# Patient Record
Sex: FEMALE | Race: WHITE | NOT HISPANIC OR LATINO | URBAN - METROPOLITAN AREA
[De-identification: names, ages, dates, MRNs, and addresses within clinical notes are randomized per-mention and may not be internally consistent; named-entity substitution may affect disease eponyms.]

---

## 2023-01-05 ENCOUNTER — LAB VISIT (OUTPATIENT)
Dept: LAB | Facility: HOSPITAL | Age: 58
End: 2023-01-05
Attending: INTERNAL MEDICINE
Payer: COMMERCIAL

## 2023-01-05 ENCOUNTER — OFFICE VISIT (OUTPATIENT)
Dept: RHEUMATOLOGY | Facility: CLINIC | Age: 58
End: 2023-01-05
Payer: COMMERCIAL

## 2023-01-05 VITALS
OXYGEN SATURATION: 97 % | BODY MASS INDEX: 23.93 KG/M2 | HEART RATE: 90 BPM | WEIGHT: 118.69 LBS | RESPIRATION RATE: 20 BRPM | SYSTOLIC BLOOD PRESSURE: 116 MMHG | DIASTOLIC BLOOD PRESSURE: 73 MMHG | HEIGHT: 59 IN

## 2023-01-05 DIAGNOSIS — D84.821 IMMUNODEFICIENCY DUE TO TREATMENT WITH IMMUNOSUPPRESSIVE MEDICATION: ICD-10-CM

## 2023-01-05 DIAGNOSIS — M06.9 RHEUMATOID ARTHRITIS, INVOLVING UNSPECIFIED SITE, UNSPECIFIED WHETHER RHEUMATOID FACTOR PRESENT: Primary | ICD-10-CM

## 2023-01-05 DIAGNOSIS — Z79.1 NSAID LONG-TERM USE: ICD-10-CM

## 2023-01-05 DIAGNOSIS — Z79.899 IMMUNODEFICIENCY DUE TO TREATMENT WITH IMMUNOSUPPRESSIVE MEDICATION: ICD-10-CM

## 2023-01-05 DIAGNOSIS — G62.9 PERIPHERAL POLYNEUROPATHY: ICD-10-CM

## 2023-01-05 DIAGNOSIS — M79.7 FIBROMYALGIA: ICD-10-CM

## 2023-01-05 DIAGNOSIS — M15.9 PRIMARY OSTEOARTHRITIS INVOLVING MULTIPLE JOINTS: ICD-10-CM

## 2023-01-05 DIAGNOSIS — Z71.89 COUNSELING AND COORDINATION OF CARE: ICD-10-CM

## 2023-01-05 DIAGNOSIS — M06.9 RHEUMATOID ARTHRITIS, INVOLVING UNSPECIFIED SITE, UNSPECIFIED WHETHER RHEUMATOID FACTOR PRESENT: ICD-10-CM

## 2023-01-05 PROCEDURE — 86140 C-REACTIVE PROTEIN: CPT | Performed by: INTERNAL MEDICINE

## 2023-01-05 PROCEDURE — 85025 COMPLETE CBC W/AUTO DIFF WBC: CPT | Performed by: INTERNAL MEDICINE

## 2023-01-05 PROCEDURE — 99204 PR OFFICE/OUTPT VISIT, NEW, LEVL IV, 45-59 MIN: ICD-10-PCS | Mod: S$GLB,,, | Performed by: INTERNAL MEDICINE

## 2023-01-05 PROCEDURE — 85652 RBC SED RATE AUTOMATED: CPT | Performed by: INTERNAL MEDICINE

## 2023-01-05 PROCEDURE — 99204 OFFICE O/P NEW MOD 45 MIN: CPT | Mod: S$GLB,,, | Performed by: INTERNAL MEDICINE

## 2023-01-05 PROCEDURE — 80053 COMPREHEN METABOLIC PANEL: CPT | Performed by: INTERNAL MEDICINE

## 2023-01-05 PROCEDURE — 99999 PR PBB SHADOW E&M-NEW PATIENT-LVL III: ICD-10-PCS | Mod: PBBFAC,,, | Performed by: INTERNAL MEDICINE

## 2023-01-05 PROCEDURE — 99999 PR PBB SHADOW E&M-NEW PATIENT-LVL III: CPT | Mod: PBBFAC,,, | Performed by: INTERNAL MEDICINE

## 2023-01-05 PROCEDURE — 36415 COLL VENOUS BLD VENIPUNCTURE: CPT | Mod: PO | Performed by: INTERNAL MEDICINE

## 2023-01-05 RX ORDER — LEFLUNOMIDE 20 MG/1
20 TABLET ORAL DAILY
Qty: 90 TABLET | Refills: 3 | Status: SHIPPED | OUTPATIENT
Start: 2023-01-05 | End: 2023-09-05 | Stop reason: SDUPTHER

## 2023-01-05 RX ORDER — LEFLUNOMIDE 20 MG/1
1 TABLET ORAL
COMMUNITY
Start: 2022-03-05 | End: 2023-01-05 | Stop reason: SDUPTHER

## 2023-01-05 RX ORDER — TRAMADOL HYDROCHLORIDE 50 MG/1
TABLET ORAL
COMMUNITY
End: 2023-01-05 | Stop reason: SDUPTHER

## 2023-01-05 RX ORDER — AZELASTINE HYDROCHLORIDE, FLUTICASONE PROPIONATE 137; 50 UG/1; UG/1
SPRAY, METERED NASAL
COMMUNITY

## 2023-01-05 RX ORDER — MEDROXYPROGESTERONE ACETATE 2.5 MG/1
2.5 TABLET ORAL
COMMUNITY
Start: 2022-02-24 | End: 2024-03-04 | Stop reason: ALTCHOICE

## 2023-01-05 RX ORDER — CYCLOBENZAPRINE HCL 5 MG
TABLET ORAL
COMMUNITY
Start: 2022-09-09 | End: 2023-01-05 | Stop reason: SDUPTHER

## 2023-01-05 RX ORDER — ESTRADIOL 0.04 MG/D
1 FILM, EXTENDED RELEASE TRANSDERMAL
COMMUNITY
Start: 2022-02-24 | End: 2023-05-19

## 2023-01-05 RX ORDER — NAPROXEN 500 MG/1
500 TABLET ORAL 2 TIMES DAILY WITH MEALS
Qty: 180 TABLET | Refills: 3 | Status: SHIPPED | OUTPATIENT
Start: 2023-01-05 | End: 2023-09-05 | Stop reason: SDUPTHER

## 2023-01-05 RX ORDER — PREDNISONE 2.5 MG/1
TABLET ORAL
COMMUNITY
Start: 2022-09-27 | End: 2023-01-05 | Stop reason: SDUPTHER

## 2023-01-05 RX ORDER — TRAMADOL HYDROCHLORIDE 50 MG/1
50 TABLET ORAL
Qty: 30 TABLET | Refills: 0 | Status: SHIPPED | OUTPATIENT
Start: 2023-01-05 | End: 2023-09-05 | Stop reason: SDUPTHER

## 2023-01-05 RX ORDER — SPIRONOLACTONE 25 MG/1
TABLET ORAL
COMMUNITY
Start: 2022-02-03 | End: 2023-05-19

## 2023-01-05 RX ORDER — FLUTICASONE PROPIONATE 50 MCG
2 SPRAY, SUSPENSION (ML) NASAL
COMMUNITY
Start: 2022-04-06

## 2023-01-05 RX ORDER — DULOXETIN HYDROCHLORIDE 60 MG/1
1 CAPSULE, DELAYED RELEASE ORAL
COMMUNITY
Start: 2022-03-01 | End: 2023-01-05 | Stop reason: SDUPTHER

## 2023-01-05 RX ORDER — GABAPENTIN 600 MG/1
TABLET ORAL
COMMUNITY
Start: 2022-08-12 | End: 2023-01-05 | Stop reason: SDUPTHER

## 2023-01-05 RX ORDER — GABAPENTIN 600 MG/1
600 TABLET ORAL DAILY
Qty: 90 TABLET | Refills: 3 | Status: SHIPPED | OUTPATIENT
Start: 2023-01-05 | End: 2023-05-19

## 2023-01-05 RX ORDER — DULOXETIN HYDROCHLORIDE 60 MG/1
60 CAPSULE, DELAYED RELEASE ORAL DAILY
Qty: 90 CAPSULE | Refills: 3 | Status: SHIPPED | OUTPATIENT
Start: 2023-01-05 | End: 2023-05-19

## 2023-01-05 RX ORDER — NAPROXEN 500 MG/1
TABLET ORAL
COMMUNITY
Start: 2022-09-24 | End: 2023-01-05 | Stop reason: SDUPTHER

## 2023-01-05 RX ORDER — PREDNISONE 2.5 MG/1
2.5 TABLET ORAL
Qty: 90 TABLET | Refills: 1 | Status: SHIPPED | OUTPATIENT
Start: 2023-01-05 | End: 2023-09-05 | Stop reason: SDUPTHER

## 2023-01-05 RX ORDER — CYCLOBENZAPRINE HCL 5 MG
5 TABLET ORAL 3 TIMES DAILY PRN
Qty: 90 TABLET | Refills: 3 | Status: SHIPPED | OUTPATIENT
Start: 2023-01-05 | End: 2023-09-05 | Stop reason: SDUPTHER

## 2023-01-05 NOTE — PROGRESS NOTES
RHEUMATOLOGY OUTPATIENT CLINIC NOTE    1/5/2023    Attending Rheumatologist: Brandon Bonilla  Primary Care Provider: Primary Doctor No   Physician Requesting Consultation: Aaareferral Self  No address on file  Chief Complaint/Reason For Consultation:  No chief complaint on file.      Subjective:       HPI  Tabby Garcia is a 57 y.o. Unknown female with medical history noted below who presents for evaluation of RA.     Patient presents for evaluation of arthritis. She notes diagnosis RA, FM, and Peripheral Neuropathy since 2000, patient of Dr. Hensley. Last seen by Rheum in 11/2022, Dr. MOSS   RA Meds- LEF 20mg (current). Prior Meds: Remicade, MTX, HCQ, Humira  FM-Cymbalta, Flexeril, Naproxen, Gabapentin  PN-Gabapentin  Uses PDN 2.5mg PRN & Tramadol PRN flares.   She reports there are good days and bad days, notes worse times is with weather changes, otherwise stable. No joint swelling. +Morning stiffness, fatigue, burning tingling of extremities.       Review of Systems   Constitutional:  Positive for fatigue. Negative for chills, fever and unexpected weight change.   HENT:  Negative for mouth sores and trouble swallowing.    Eyes:  Negative for redness and eye dryness.   Respiratory:  Negative for cough and shortness of breath.    Cardiovascular:  Negative for chest pain.   Gastrointestinal:  Negative for abdominal distention, constipation, diarrhea, nausea and vomiting.   Genitourinary:  Negative for dysuria, genital sores and vaginal dryness.   Musculoskeletal:  Positive for arthralgias, myalgias, neck pain and neck stiffness. Negative for back pain, gait problem, joint swelling, leg pain and joint deformity.   Integumentary:  Negative for rash.   Neurological:  Positive for numbness. Negative for weakness and headaches.   Hematological:  Negative for adenopathy. Does not bruise/bleed easily.   Psychiatric/Behavioral:  Positive for sleep disturbance. Negative for confusion and decreased  concentration. The patient is not nervous/anxious.    All other systems reviewed and are negative.     Chronic comorbid conditions affecting medical decision making today:  No past medical history on file.  No past surgical history on file.  No family history on file.  Social History     Substance and Sexual Activity   Alcohol Use Not on file     Social History     Tobacco Use   Smoking Status Not on file   Smokeless Tobacco Not on file     Social History     Substance and Sexual Activity   Drug Use Not on file       Current Outpatient Medications:     azelastine-fluticasone (DYMISTA) 137-50 mcg/spray Spry nassal spray, , Disp: , Rfl:     estradioL (VIVELLE-DOT) 0.0375 mg/24 hr, Place 1 patch onto the skin., Disp: , Rfl:     fluticasone propionate (FLONASE) 50 mcg/actuation nasal spray, 2 sprays by Nasal route., Disp: , Rfl:     medroxyPROGESTERone (PROVERA) 2.5 MG tablet, Take 2.5 mg by mouth., Disp: , Rfl:     spironolactone (ALDACTONE) 25 MG tablet, , Disp: , Rfl:     cyclobenzaprine (FLEXERIL) 5 MG tablet, Take 1 tablet (5 mg total) by mouth 3 (three) times daily as needed for Muscle spasms., Disp: 90 tablet, Rfl: 3    DULoxetine (CYMBALTA) 60 MG capsule, Take 1 capsule (60 mg total) by mouth once daily., Disp: 90 capsule, Rfl: 3    gabapentin (NEURONTIN) 600 MG tablet, Take 1 tablet (600 mg total) by mouth once daily., Disp: 90 tablet, Rfl: 3    leflunomide (ARAVA) 20 MG Tab, Take 1 tablet (20 mg total) by mouth once daily., Disp: 90 tablet, Rfl: 3    naproxen (NAPROSYN) 500 MG tablet, Take 1 tablet (500 mg total) by mouth 2 (two) times daily with meals., Disp: 180 tablet, Rfl: 3    predniSONE (DELTASONE) 2.5 MG tablet, Take 1 tablet (2.5 mg total) by mouth as needed (flares)., Disp: 90 tablet, Rfl: 1    traMADoL (ULTRAM) 50 mg tablet, Take 1 tablet (50 mg total) by mouth every 24 hours as needed for Pain., Disp: 30 tablet, Rfl: 0     Objective:         Vitals:    01/05/23 1257   BP: 116/73   Pulse: 90   Resp:  20     Physical Exam  Can make fist, no synovitis  Heberden and chente nodes  Knee crepitus  Negative ankle/MTP  Tender Points:  No   Yes  []    [x]   Low cervical anterior aspect    []    [x]   Costochondral Junction   []    [x]   Lateral Epicondyle  []    [x]   Suboccipital   []    [x]   Trapezius   []    [x]   Supraspinatus   []    [x]   Gluteal   []    [x]   Greater trochanter  []    [x]   Knee    Reviewed old and all outside pertinent medical records available.    All lab results personally reviewed and interpreted by me.  No results found for: WBC, HGB, HCT, MCV, MCH, MCHC, RDW, PLT, MPV, NEUTROABS, LYMPHOABS, MONOABS, EOSINOABS, BASOSABS, NEUTROPCT, LYMPHOPCT, MONOPCT, EOSINOPCT, BASOPCT, DIFFTYPE, RBCMORPHOLOG, PLTEST    No results found for: NA, K, CL, CO2, GLU, BUN, LABCREA, CALCIUM, PROT, ALBUMIN, BILITOT, AST, ALKPHOS, ALT, GFRAA, GFRNONAA    No results found for: COLORU, APPEARANCEUA, SPECGRAV, PHUR, PHUA, PROTEINUA, GLUCOSEU, KETONESU, BLOODU, LEUKOCYTESUR, NITRITE, UROBILINOGEN    No results found for: CRP    No results found for: SEDRATE, ERYTHROCYTES    No results found for: ANALILIA, RF, SEDRATE    No components found for: 25OHVITDTOT, 20URNDAR7, 25UFQXSL9, METHODNOTE    No results found for: URICACID    No components found for: TSPOTTB        Imaging:  All imaging reviewed and independently interpreted by me.         ASSESSMENT / PLAN:     Tabby Garcia is a 57 y.o. Unknown female with:      1. Rheumatoid arthritis, involving unspecified site, unspecified whether rheumatoid factor present  - patient with Known RA  - will bring outside records  - continue LEF 20mg  - PDN PRN flares  - update labs  - reassurance   - predniSONE (DELTASONE) 2.5 MG tablet; Take 1 tablet (2.5 mg total) by mouth as needed (flares).  Dispense: 90 tablet; Refill: 1  - leflunomide (ARAVA) 20 MG Tab; Take 1 tablet (20 mg total) by mouth once daily.  Dispense: 90 tablet; Refill: 3  - C-Reactive Protein; Future  - CBC Auto  Differential; Future  - Comprehensive Metabolic Panel; Future  - Sedimentation rate; Future    2. Fibromyalgia  - stable  - continue Naproxen, Gabapentin, Cymbalta, Flexeril  - sleep hygiene and stress management discussed  - reassurance and exercise   - naproxen (NAPROSYN) 500 MG tablet; Take 1 tablet (500 mg total) by mouth 2 (two) times daily with meals.  Dispense: 180 tablet; Refill: 3  - gabapentin (NEURONTIN) 600 MG tablet; Take 1 tablet (600 mg total) by mouth once daily.  Dispense: 90 tablet; Refill: 3  - DULoxetine (CYMBALTA) 60 MG capsule; Take 1 capsule (60 mg total) by mouth once daily.  Dispense: 90 capsule; Refill: 3  - cyclobenzaprine (FLEXERIL) 5 MG tablet; Take 1 tablet (5 mg total) by mouth 3 (three) times daily as needed for Muscle spasms.  Dispense: 90 tablet; Refill: 3    3. Primary osteoarthritis involving multiple joints  - chronic  - will provide a one time prescription while she gets me a discharge from clinic from her prior provider  - reassurance and exercise   - traMADoL (ULTRAM) 50 mg tablet; Take 1 tablet (50 mg total) by mouth every 24 hours as needed for Pain.  Dispense: 30 tablet; Refill: 0    4. Peripheral polyneuropathy  - stable  - continue Gabapentin   - gabapentin (NEURONTIN) 600 MG tablet; Take 1 tablet (600 mg total) by mouth once daily.  Dispense: 90 tablet; Refill: 3    5. Immunodeficiency due to treatment with immunosuppressive medication  - quarterly labs  - vaccines per guidelines     6. Chronic NSAID use  - no history of GI bleed or coronary artery disease.  - previous labs without features of CKD.  - clinical significance side effect of long-term NSAID use discussed in detail.  - recommended for alternative therapies for pain management.      7. Other specified counseling  - over 10 minutes spent regarding below topics:  - Immunization counseling done.  - Weight loss counseling done.  - Nutrition and exercise counseling.  - Limitation of alcohol consumption.  - Regular  exercise:  Aerobic and resistance.  - Medication counseling provided.      Follow up in about 6 months (around 7/5/2023).    Method of contact with patient concerns: [facility]    Disclaimer:  This note is prepared using voice recognition software and as such is likely to have errors and has not been proof read. Please contact me for questions.     Time spent: 45 minutes in face to face discussion concerning diagnosis, prognosis, review of lab and test results, benefits of treatment as well as management of disease, counseling of patient and coordination of care between various health care providers.  Greater than half the time spent was used for coordination of care and counseling of patient.    Brandon Bonilla M.D.  Rheumatology Department   [facility]

## 2023-01-06 LAB
ALBUMIN SERPL BCP-MCNC: 3.9 G/DL (ref 3.5–5.2)
ALP SERPL-CCNC: 83 U/L (ref 55–135)
ALT SERPL W/O P-5'-P-CCNC: 36 U/L (ref 10–44)
ANION GAP SERPL CALC-SCNC: 7 MMOL/L (ref 8–16)
AST SERPL-CCNC: 32 U/L (ref 10–40)
BASOPHILS # BLD AUTO: 0.07 K/UL (ref 0–0.2)
BASOPHILS NFR BLD: 1.1 % (ref 0–1.9)
BILIRUB SERPL-MCNC: 0.3 MG/DL (ref 0.1–1)
BUN SERPL-MCNC: 21 MG/DL (ref 6–20)
CALCIUM SERPL-MCNC: 9.9 MG/DL (ref 8.7–10.5)
CHLORIDE SERPL-SCNC: 105 MMOL/L (ref 95–110)
CO2 SERPL-SCNC: 28 MMOL/L (ref 23–29)
CREAT SERPL-MCNC: 0.8 MG/DL (ref 0.5–1.4)
CRP SERPL-MCNC: 5.7 MG/L (ref 0–8.2)
DIFFERENTIAL METHOD: ABNORMAL
EOSINOPHIL # BLD AUTO: 0.1 K/UL (ref 0–0.5)
EOSINOPHIL NFR BLD: 2.3 % (ref 0–8)
ERYTHROCYTE [DISTWIDTH] IN BLOOD BY AUTOMATED COUNT: 13.6 % (ref 11.5–14.5)
ERYTHROCYTE [SEDIMENTATION RATE] IN BLOOD BY PHOTOMETRIC METHOD: 8 MM/HR (ref 0–36)
EST. GFR  (NO RACE VARIABLE): >60 ML/MIN/1.73 M^2
GLUCOSE SERPL-MCNC: 98 MG/DL (ref 70–110)
HCT VFR BLD AUTO: 39.5 % (ref 37–48.5)
HGB BLD-MCNC: 12.6 G/DL (ref 12–16)
IMM GRANULOCYTES # BLD AUTO: 0.01 K/UL (ref 0–0.04)
IMM GRANULOCYTES NFR BLD AUTO: 0.2 % (ref 0–0.5)
LYMPHOCYTES # BLD AUTO: 1.6 K/UL (ref 1–4.8)
LYMPHOCYTES NFR BLD: 25.7 % (ref 18–48)
MCH RBC QN AUTO: 31 PG (ref 27–31)
MCHC RBC AUTO-ENTMCNC: 31.9 G/DL (ref 32–36)
MCV RBC AUTO: 97 FL (ref 82–98)
MONOCYTES # BLD AUTO: 0.8 K/UL (ref 0.3–1)
MONOCYTES NFR BLD: 12.3 % (ref 4–15)
NEUTROPHILS # BLD AUTO: 3.6 K/UL (ref 1.8–7.7)
NEUTROPHILS NFR BLD: 58.4 % (ref 38–73)
NRBC BLD-RTO: 0 /100 WBC
PLATELET # BLD AUTO: 237 K/UL (ref 150–450)
PMV BLD AUTO: 12.2 FL (ref 9.2–12.9)
POTASSIUM SERPL-SCNC: 4.3 MMOL/L (ref 3.5–5.1)
PROT SERPL-MCNC: 7 G/DL (ref 6–8.4)
RBC # BLD AUTO: 4.06 M/UL (ref 4–5.4)
SODIUM SERPL-SCNC: 140 MMOL/L (ref 136–145)
WBC # BLD AUTO: 6.11 K/UL (ref 3.9–12.7)

## 2023-01-20 ENCOUNTER — PATIENT MESSAGE (OUTPATIENT)
Dept: RHEUMATOLOGY | Facility: CLINIC | Age: 58
End: 2023-01-20
Payer: COMMERCIAL

## 2023-05-19 ENCOUNTER — OFFICE VISIT (OUTPATIENT)
Dept: OBSTETRICS AND GYNECOLOGY | Facility: CLINIC | Age: 58
End: 2023-05-19
Payer: COMMERCIAL

## 2023-05-19 VITALS
HEIGHT: 58 IN | DIASTOLIC BLOOD PRESSURE: 72 MMHG | WEIGHT: 116.88 LBS | SYSTOLIC BLOOD PRESSURE: 119 MMHG | BODY MASS INDEX: 24.54 KG/M2

## 2023-05-19 DIAGNOSIS — Z92.29 HISTORY OF POSTMENOPAUSAL HRT: Primary | ICD-10-CM

## 2023-05-19 PROCEDURE — 99214 OFFICE O/P EST MOD 30 MIN: CPT | Mod: S$GLB,,, | Performed by: STUDENT IN AN ORGANIZED HEALTH CARE EDUCATION/TRAINING PROGRAM

## 2023-05-19 PROCEDURE — 99214 PR OFFICE/OUTPT VISIT, EST, LEVL IV, 30-39 MIN: ICD-10-PCS | Mod: S$GLB,,, | Performed by: STUDENT IN AN ORGANIZED HEALTH CARE EDUCATION/TRAINING PROGRAM

## 2023-05-19 RX ORDER — DULOXETIN HYDROCHLORIDE 60 MG/1
1 CAPSULE, DELAYED RELEASE ORAL DAILY
COMMUNITY
Start: 2023-04-18 | End: 2023-09-05 | Stop reason: SDUPTHER

## 2023-05-19 RX ORDER — GABAPENTIN 600 MG/1
1 TABLET ORAL NIGHTLY
COMMUNITY
Start: 2023-04-18 | End: 2023-09-05 | Stop reason: SDUPTHER

## 2023-05-19 RX ORDER — ESTRADIOL 0.1 MG/D
PATCH, EXTENDED RELEASE TRANSDERMAL
COMMUNITY
Start: 2023-01-06 | End: 2024-03-04 | Stop reason: ALTCHOICE

## 2023-05-19 NOTE — PROGRESS NOTES
History & Physical  Gynecology      SUBJECTIVE:     Chief Complaint: Consult (Pt states that she use to see [facility] doc's. Pt states that they sent her thru rotating MD's to get her hormones. Pt states that she would like to just establish care and discuss her HRT's.)       History of Present Illness:  Tabby presents for HRT consult  She was on low dose patch for years, then continued by her next OBGYN. Then her sister had what sounds like hormone - sensitive breat cancer in her 40's. She then started seeing a high  and was getting q6 MRI,Mammo and ultrasounds. That doctor told her to stop the patch. Then she went to a new OBGYN who told her to start it again. Then her PCP told her to stop it. So, she has had a lot of back and forth with it. Since stopping it in January, she reports hot flashes still happen, but not so bad, not much mood issues, but low libido, low energy, vaginal dryness and weight gain are really bothering her  She has been working out at least three times a week and eating healthy.   She is on gabapentin for fibromyalgia already. Her daughter did not like paxil and she has been on celexa in the past which she did not like        Review of patient's allergies indicates:   Allergen Reactions    Doxycycline Hives    Macrobid [nitrofurantoin monohyd/m-cryst] Hives       Past Medical History:   Diagnosis Date    Amenorrhea     started menopause    Dyspareunia 2016    started when I began menopause    Fibromyalgia     Infertility, female     Rheumatoid arthritis, unspecified      No past surgical history on file.  OB History    No obstetric history on file.       Family History   Problem Relation Age of Onset    Breast cancer Maternal Grandmother         got breast cancer at 85 years old    Breast cancer Sister         had a double masectomy     Ovarian cancer Paternal Aunt           from ovarian cancer     Social History     Tobacco Use    Smoking status: Never    Substance Use Topics    Alcohol use: Never    Drug use: Never       Current Outpatient Medications   Medication Sig    DULoxetine (CYMBALTA) 60 MG capsule Take 1 capsule by mouth once daily.    gabapentin (NEURONTIN) 600 MG tablet Take 1 tablet by mouth every evening.    azelastine-fluticasone (DYMISTA) 137-50 mcg/spray Spry nassal spray     cyclobenzaprine (FLEXERIL) 5 MG tablet Take 1 tablet (5 mg total) by mouth 3 (three) times daily as needed for Muscle spasms.    JUVE 0.1 mg/24 hr PTSW Place onto the skin.    fluticasone propionate (FLONASE) 50 mcg/actuation nasal spray 2 sprays by Nasal route.    leflunomide (ARAVA) 20 MG Tab Take 1 tablet (20 mg total) by mouth once daily.    medroxyPROGESTERone (PROVERA) 2.5 MG tablet Take 2.5 mg by mouth.    naproxen (NAPROSYN) 500 MG tablet Take 1 tablet (500 mg total) by mouth 2 (two) times daily with meals.    predniSONE (DELTASONE) 2.5 MG tablet Take 1 tablet (2.5 mg total) by mouth as needed (flares).    traMADoL (ULTRAM) 50 mg tablet Take 1 tablet (50 mg total) by mouth every 24 hours as needed for Pain.     No current facility-administered medications for this visit.         Review of Systems:  Review of Systems   Constitutional:  Positive for unexpected weight change.   Genitourinary:  Positive for decreased libido, dyspareunia, hot flashes and vaginal dryness. Negative for vaginal bleeding.   Integumentary:  Negative for breast mass.   Breast: Negative for mass     OBJECTIVE:     Physical Exam:  Physical Exam  Vitals reviewed.   Constitutional:       General: She is not in acute distress.     Appearance: She is well-developed. She is not diaphoretic.   HENT:      Head: Normocephalic and atraumatic.   Eyes:      Conjunctiva/sclera: Conjunctivae normal.   Cardiovascular:      Rate and Rhythm: Normal rate.   Pulmonary:      Effort: Pulmonary effort is normal.   Musculoskeletal:         General: Normal range of motion.      Cervical back: Normal range of motion.    Skin:     General: Skin is warm and dry.   Neurological:      Mental Status: She is alert and oriented to person, place, and time.         ASSESSMENT:       ICD-10-CM ICD-9-CM    1. History of postmenopausal HRT  Z92.29 V87.49              Plan:      A full discussion of the benefit-risk ratio of hormonal replacement therapy was carried out. Improvement in vasomotor and other climacteric symptoms is discussed, including possible improvements in sleep and mood. Reduction of risk for osteoporosis was explained. We discussed the study data showing increased risk of thrombo-embolic events such as myocardial infarction, stroke and also possibly breast cancer with estrogen replacement, and how this might affect her. The range of side effects such as breast tenderness, weight gain and including possible increases in lifetime risk of breast cancer and possible thrombotic complications was discussed. We also discussed ACOG's recommendation to use hormone replacement therapy for the relief of hot flashes alone and to be on the lowest dose possible for the shortest amount of time. We reviewed alternative methods that have been shown to improve symptoms as well including gabapentin, SSRI, SNRI, clonidine, etc.  Alternative such as herbal and soy-based products were reviewed.  Pt opts for topical estrogen  Reviewed Low Libido  Reviewed low inflammation diet for weight gain in the post menopausal state    Face to Face time with patient: 20    30 minutes of total time spent on the encounter, which includes face to face time and non-face to face time preparing to see the patient (eg, review of tests), Obtaining and/or reviewing separately obtained history, Documenting clinical information in the electronic or other health record, Independently interpreting results (not separately reported) and communicating results to the patient/family/caregiver, or Care coordination (not separately reported).      Susan Philippe

## 2023-05-19 NOTE — PATIENT INSTRUCTIONS
"[email or web address]    Plan for Libido  - start strenuous exercise to stimulate endogenous testosterone production, as well as dopamine release from endorphins   - encourage manual stimulation, this will help learn to achieve orgasm on own as well as begin habits of regular sexual stimulation without pressure to perform in front of partner  - encourage increasing "romance" in relationship, needs to go on dates with partner  - discuss importance of sensual non-sexual touch with partner e.g. like massages, cuddling  - discuss non-penetrative, non-orgasm oriented sexual touch, take pressure off emphasis on penetrative intercourse         "

## 2023-09-05 ENCOUNTER — LAB VISIT (OUTPATIENT)
Dept: LAB | Facility: HOSPITAL | Age: 58
End: 2023-09-05
Attending: INTERNAL MEDICINE
Payer: COMMERCIAL

## 2023-09-05 ENCOUNTER — OFFICE VISIT (OUTPATIENT)
Dept: RHEUMATOLOGY | Facility: CLINIC | Age: 58
End: 2023-09-05
Payer: COMMERCIAL

## 2023-09-05 VITALS
HEART RATE: 73 BPM | BODY MASS INDEX: 25.64 KG/M2 | OXYGEN SATURATION: 98 % | SYSTOLIC BLOOD PRESSURE: 135 MMHG | HEIGHT: 58 IN | DIASTOLIC BLOOD PRESSURE: 81 MMHG | WEIGHT: 122.13 LBS

## 2023-09-05 DIAGNOSIS — M15.9 PRIMARY OSTEOARTHRITIS INVOLVING MULTIPLE JOINTS: ICD-10-CM

## 2023-09-05 DIAGNOSIS — D84.821 IMMUNODEFICIENCY DUE TO TREATMENT WITH IMMUNOSUPPRESSIVE MEDICATION: ICD-10-CM

## 2023-09-05 DIAGNOSIS — M79.7 FIBROMYALGIA: ICD-10-CM

## 2023-09-05 DIAGNOSIS — M06.9 RHEUMATOID ARTHRITIS, INVOLVING UNSPECIFIED SITE, UNSPECIFIED WHETHER RHEUMATOID FACTOR PRESENT: ICD-10-CM

## 2023-09-05 DIAGNOSIS — M06.9 RHEUMATOID ARTHRITIS, INVOLVING UNSPECIFIED SITE, UNSPECIFIED WHETHER RHEUMATOID FACTOR PRESENT: Primary | ICD-10-CM

## 2023-09-05 DIAGNOSIS — Z79.1 NSAID LONG-TERM USE: ICD-10-CM

## 2023-09-05 DIAGNOSIS — G62.9 NEUROPATHY: ICD-10-CM

## 2023-09-05 DIAGNOSIS — Z71.89 COUNSELING AND COORDINATION OF CARE: ICD-10-CM

## 2023-09-05 DIAGNOSIS — Z79.899 IMMUNODEFICIENCY DUE TO TREATMENT WITH IMMUNOSUPPRESSIVE MEDICATION: ICD-10-CM

## 2023-09-05 PROBLEM — Q78.8 OSTEOPOIKILOSIS: Status: ACTIVE | Noted: 2023-09-05

## 2023-09-05 LAB
ALBUMIN SERPL BCP-MCNC: 3.7 G/DL (ref 3.5–5.2)
ALP SERPL-CCNC: 115 U/L (ref 55–135)
ALT SERPL W/O P-5'-P-CCNC: 22 U/L (ref 10–44)
ANION GAP SERPL CALC-SCNC: 8 MMOL/L (ref 8–16)
AST SERPL-CCNC: 20 U/L (ref 10–40)
BASOPHILS # BLD AUTO: 0.08 K/UL (ref 0–0.2)
BASOPHILS NFR BLD: 1.2 % (ref 0–1.9)
BILIRUB SERPL-MCNC: 0.2 MG/DL (ref 0.1–1)
BUN SERPL-MCNC: 17 MG/DL (ref 6–20)
CALCIUM SERPL-MCNC: 9.2 MG/DL (ref 8.7–10.5)
CHLORIDE SERPL-SCNC: 107 MMOL/L (ref 95–110)
CO2 SERPL-SCNC: 26 MMOL/L (ref 23–29)
CREAT SERPL-MCNC: 0.8 MG/DL (ref 0.5–1.4)
CRP SERPL-MCNC: 3.7 MG/L (ref 0–8.2)
DIFFERENTIAL METHOD: ABNORMAL
EOSINOPHIL # BLD AUTO: 0.2 K/UL (ref 0–0.5)
EOSINOPHIL NFR BLD: 3.6 % (ref 0–8)
ERYTHROCYTE [DISTWIDTH] IN BLOOD BY AUTOMATED COUNT: 13.4 % (ref 11.5–14.5)
ERYTHROCYTE [SEDIMENTATION RATE] IN BLOOD BY PHOTOMETRIC METHOD: 3 MM/HR (ref 0–36)
EST. GFR  (NO RACE VARIABLE): >60 ML/MIN/1.73 M^2
GLUCOSE SERPL-MCNC: 88 MG/DL (ref 70–110)
HCT VFR BLD AUTO: 38.6 % (ref 37–48.5)
HGB BLD-MCNC: 12.2 G/DL (ref 12–16)
IMM GRANULOCYTES # BLD AUTO: 0.02 K/UL (ref 0–0.04)
IMM GRANULOCYTES NFR BLD AUTO: 0.3 % (ref 0–0.5)
LYMPHOCYTES # BLD AUTO: 1.9 K/UL (ref 1–4.8)
LYMPHOCYTES NFR BLD: 29 % (ref 18–48)
MCH RBC QN AUTO: 29.8 PG (ref 27–31)
MCHC RBC AUTO-ENTMCNC: 31.6 G/DL (ref 32–36)
MCV RBC AUTO: 94 FL (ref 82–98)
MONOCYTES # BLD AUTO: 0.8 K/UL (ref 0.3–1)
MONOCYTES NFR BLD: 12.3 % (ref 4–15)
NEUTROPHILS # BLD AUTO: 3.5 K/UL (ref 1.8–7.7)
NEUTROPHILS NFR BLD: 53.6 % (ref 38–73)
NRBC BLD-RTO: 0 /100 WBC
PLATELET # BLD AUTO: 238 K/UL (ref 150–450)
PMV BLD AUTO: 12.2 FL (ref 9.2–12.9)
POTASSIUM SERPL-SCNC: 4.3 MMOL/L (ref 3.5–5.1)
PROT SERPL-MCNC: 6.7 G/DL (ref 6–8.4)
RBC # BLD AUTO: 4.1 M/UL (ref 4–5.4)
SODIUM SERPL-SCNC: 141 MMOL/L (ref 136–145)
WBC # BLD AUTO: 6.44 K/UL (ref 3.9–12.7)

## 2023-09-05 PROCEDURE — 36415 COLL VENOUS BLD VENIPUNCTURE: CPT | Mod: PO | Performed by: INTERNAL MEDICINE

## 2023-09-05 PROCEDURE — 80053 COMPREHEN METABOLIC PANEL: CPT | Performed by: INTERNAL MEDICINE

## 2023-09-05 PROCEDURE — 99999 PR PBB SHADOW E&M-EST. PATIENT-LVL III: ICD-10-PCS | Mod: PBBFAC,,, | Performed by: INTERNAL MEDICINE

## 2023-09-05 PROCEDURE — 86140 C-REACTIVE PROTEIN: CPT | Performed by: INTERNAL MEDICINE

## 2023-09-05 PROCEDURE — 99214 OFFICE O/P EST MOD 30 MIN: CPT | Mod: S$GLB,,, | Performed by: INTERNAL MEDICINE

## 2023-09-05 PROCEDURE — 85025 COMPLETE CBC W/AUTO DIFF WBC: CPT | Performed by: INTERNAL MEDICINE

## 2023-09-05 PROCEDURE — 99999 PR PBB SHADOW E&M-EST. PATIENT-LVL III: CPT | Mod: PBBFAC,,, | Performed by: INTERNAL MEDICINE

## 2023-09-05 PROCEDURE — 85652 RBC SED RATE AUTOMATED: CPT | Performed by: INTERNAL MEDICINE

## 2023-09-05 PROCEDURE — 99214 PR OFFICE/OUTPT VISIT, EST, LEVL IV, 30-39 MIN: ICD-10-PCS | Mod: S$GLB,,, | Performed by: INTERNAL MEDICINE

## 2023-09-05 RX ORDER — NAPROXEN 500 MG/1
500 TABLET ORAL 2 TIMES DAILY WITH MEALS
Qty: 180 TABLET | Refills: 3 | Status: SHIPPED | OUTPATIENT
Start: 2023-09-05 | End: 2023-11-20 | Stop reason: SDUPTHER

## 2023-09-05 RX ORDER — LEFLUNOMIDE 20 MG/1
20 TABLET ORAL DAILY
Qty: 90 TABLET | Refills: 3 | Status: SHIPPED | OUTPATIENT
Start: 2023-09-05 | End: 2023-11-20 | Stop reason: SDUPTHER

## 2023-09-05 RX ORDER — CYCLOBENZAPRINE HCL 5 MG
5 TABLET ORAL 3 TIMES DAILY PRN
Qty: 90 TABLET | Refills: 3 | Status: SHIPPED | OUTPATIENT
Start: 2023-09-05 | End: 2023-11-20 | Stop reason: SDUPTHER

## 2023-09-05 RX ORDER — GABAPENTIN 600 MG/1
600 TABLET ORAL NIGHTLY
Qty: 90 TABLET | Refills: 1 | Status: SHIPPED | OUTPATIENT
Start: 2023-09-05 | End: 2024-03-04 | Stop reason: SDUPTHER

## 2023-09-05 RX ORDER — TRAMADOL HYDROCHLORIDE 50 MG/1
50 TABLET ORAL
Qty: 30 TABLET | Refills: 5 | Status: SHIPPED | OUTPATIENT
Start: 2023-09-05 | End: 2023-11-20 | Stop reason: SDUPTHER

## 2023-09-05 RX ORDER — PREDNISONE 2.5 MG/1
2.5 TABLET ORAL
Qty: 90 TABLET | Refills: 1 | Status: SHIPPED | OUTPATIENT
Start: 2023-09-05 | End: 2023-11-20 | Stop reason: SDUPTHER

## 2023-09-05 RX ORDER — DULOXETIN HYDROCHLORIDE 60 MG/1
60 CAPSULE, DELAYED RELEASE ORAL DAILY
Qty: 90 CAPSULE | Refills: 1 | Status: SHIPPED | OUTPATIENT
Start: 2023-09-05 | End: 2023-11-20 | Stop reason: SDUPTHER

## 2023-09-05 NOTE — PROGRESS NOTES
"       RHEUMATOLOGY OUTPATIENT CLINIC NOTE    9/5/2023    Attending Rheumatologist: Brandon Bonilla  Primary Care Provider: No, Primary Doctor   Physician Requesting Consultation: No referring provider defined for this encounter.  Chief Complaint/Reason For Consultation:  Joint Pain      Subjective:       HPI  Tabby Garcia is a 57 y.o. Unknown female with medical history noted below who presents for evaluation of RA.   Patient presents for evaluation of arthritis. She notes diagnosis RA, FM, and Peripheral Neuropathy since 2000, patient of Dr. Hensley. Last seen by Rheum in 11/2022, Dr. HALE.   RA Meds- LEF 20mg (current). Prior Meds: Remicade, MTX, HCQ, Humira  FM-Cymbalta, Flexeril, Naproxen, Gabapentin  PN-Gabapentin  Uses PDN 2.5mg PRN & Tramadol PRN flares.   She reports there are good days and bad days, notes worse times is with weather changes, otherwise stable. No joint swelling. +Morning stiffness, fatigue, burning tingling of extremities.     Today  Patient here for follow up.   Last seen January 2023, at which point management continued. Had letter in Media saying Tramadol given by Dr. HALE, but since left I have provided. She notes using PRN with relief makes her feel like a "zombie". Does endorse joint pain in her hands and knees, sometimes swelling of PIP, notes it changes based on what she does. Otherwise stable, tolerating meds.       Review of Systems   Constitutional:  Positive for fatigue. Negative for chills, fever and unexpected weight change.   HENT:  Negative for mouth sores and trouble swallowing.    Eyes:  Negative for redness and eye dryness.   Respiratory:  Negative for cough and shortness of breath.    Cardiovascular:  Negative for chest pain.   Gastrointestinal:  Negative for abdominal distention, constipation, diarrhea, nausea and vomiting.   Genitourinary:  Negative for dysuria, genital sores and vaginal dryness.   Musculoskeletal:  Positive for arthralgias, myalgias, neck pain " and neck stiffness. Negative for back pain, gait problem, joint swelling, leg pain and joint deformity.   Integumentary:  Negative for rash.   Neurological:  Positive for numbness. Negative for weakness and headaches.   Hematological:  Negative for adenopathy. Does not bruise/bleed easily.   Psychiatric/Behavioral:  Positive for sleep disturbance. Negative for confusion and decreased concentration. The patient is not nervous/anxious.    All other systems reviewed and are negative.       Chronic comorbid conditions affecting medical decision making today:  Past Medical History:   Diagnosis Date    Amenorrhea     started menopause    Dyspareunia     started when I began menopause    Fibromyalgia     Infertility, female     Rheumatoid arthritis, unspecified      History reviewed. No pertinent surgical history.  Family History   Problem Relation Age of Onset    Breast cancer Maternal Grandmother         got breast cancer at 85 years old    Breast cancer Sister         had a double masectomy     Ovarian cancer Paternal Aunt           from ovarian cancer     Social History     Substance and Sexual Activity   Alcohol Use Never     Social History     Tobacco Use   Smoking Status Never   Smokeless Tobacco Not on file     Social History     Substance and Sexual Activity   Drug Use Never       Current Outpatient Medications:     azelastine-fluticasone (DYMISTA) 137-50 mcg/spray Spry nassal spray, , Disp: , Rfl:     fluticasone propionate (FLONASE) 50 mcg/actuation nasal spray, 2 sprays by Nasal route., Disp: , Rfl:     cyclobenzaprine (FLEXERIL) 5 MG tablet, Take 1 tablet (5 mg total) by mouth 3 (three) times daily as needed for Muscle spasms., Disp: 90 tablet, Rfl: 3    JUVE 0.1 mg/24 hr PTSW, Place onto the skin., Disp: , Rfl:     DULoxetine (CYMBALTA) 60 MG capsule, Take 1 capsule (60 mg total) by mouth once daily., Disp: 90 capsule, Rfl: 1    gabapentin (NEURONTIN) 600 MG tablet, Take 1 tablet (600  "mg total) by mouth every evening., Disp: 90 tablet, Rfl: 1    leflunomide (ARAVA) 20 MG Tab, Take 1 tablet (20 mg total) by mouth once daily., Disp: 90 tablet, Rfl: 3    medroxyPROGESTERone (PROVERA) 2.5 MG tablet, Take 2.5 mg by mouth., Disp: , Rfl:     naproxen (NAPROSYN) 500 MG tablet, Take 1 tablet (500 mg total) by mouth 2 (two) times daily with meals., Disp: 180 tablet, Rfl: 3    predniSONE (DELTASONE) 2.5 MG tablet, Take 1 tablet (2.5 mg total) by mouth as needed (flares)., Disp: 90 tablet, Rfl: 1    traMADoL (ULTRAM) 50 mg tablet, Take 1 tablet (50 mg total) by mouth every 24 hours as needed for Pain., Disp: 30 tablet, Rfl: 5     Objective:         Vitals:    09/05/23 0902   BP: 135/81   Pulse: 73     Physical Exam  Can make fist, no synovitis, TTP over MCP/PIP/DIP  Heberden and chente nodes  Knee crepitus  Negative ankle/MTP  Tender Points:  No   Yes  []    [x]   Low cervical anterior aspect    []    [x]   Costochondral Junction   []    [x]   Lateral Epicondyle  []    [x]   Suboccipital   []    [x]   Trapezius   []    [x]   Supraspinatus   []    [x]   Gluteal   []    [x]   Greater trochanter  []    [x]   Knee    Reviewed old and all outside pertinent medical records available.    All lab results personally reviewed and interpreted by me.  Lab Results   Component Value Date    WBC 6.11 01/05/2023    HGB 12.6 01/05/2023    HCT 39.5 01/05/2023    MCV 97 01/05/2023    MCH 31.0 01/05/2023    MCHC 31.9 (L) 01/05/2023    RDW 13.6 01/05/2023     01/05/2023    MPV 12.2 01/05/2023       Lab Results   Component Value Date     01/05/2023    K 4.3 01/05/2023     01/05/2023    CO2 28 01/05/2023    GLU 98 01/05/2023    BUN 21 (H) 01/05/2023    CALCIUM 9.9 01/05/2023    PROT 7.0 01/05/2023    ALBUMIN 3.9 01/05/2023    BILITOT 0.3 01/05/2023    AST 32 01/05/2023    ALKPHOS 83 01/05/2023    ALT 36 01/05/2023       No results found for: "COLORU", "APPEARANCEUA", "SPECGRAV", "PHUR", "PHUA", "PROTEINUA", " ""GLUCOSEU", "KETONESU", "BLOODU", "LEUKOCYTESUR", "NITRITE", "UROBILINOGEN"    Lab Results   Component Value Date    CRP 5.7 01/05/2023       Lab Results   Component Value Date    SEDRATE 8 01/05/2023       Lab Results   Component Value Date    SEDRATE 8 01/05/2023       No components found for: "25OHVITDTOT", "07XVEVFM2", "89WQTBQJ3", "METHODNOTE"    No results found for: "URICACID"    No components found for: "TSPOTTB"        Imaging:  All imaging reviewed and independently interpreted by me.         ASSESSMENT / PLAN:     Tabby Garcia is a 57 y.o. Unknown female with:      1. Rheumatoid arthritis, involving unspecified site, unspecified whether rheumatoid factor present  - patient with Known RA  - has some TTP, will check inflammatory markers, can be from OA, monitor   - continue LEF 20mg  - PDN PRN flares  - update labs  - reassurance     2. Fibromyalgia  - stable  - continue Naproxen, Gabapentin, Cymbalta, Flexeril  - sleep hygiene and stress management reinforced   - reassurance and exercise     3. Primary osteoarthritis involving multiple joints  - chronic  - Tramadol PRN,  reviewed  - reassurance and exercise    4. Peripheral polyneuropathy  - stable  - continue Gabapentin   - gabapentin (NEURONTIN) 600 MG tablet; Take 1 tablet (600 mg total) by mouth once daily.  Dispense: 90 tablet; Refill: 3    5. Immunodeficiency due to treatment with immunosuppressive medication  - quarterly labs  - vaccines per guidelines   - immunosuppression/infectious precautions discussed     6. Chronic NSAID use  - no history of GI bleed or coronary artery disease.  - previous labs without features of CKD.  - clinical significance side effect of long-term NSAID use discussed in detail.  - recommended for alternative therapies for pain management.      7. Other specified counseling  - over 10 minutes spent regarding below topics:  - Immunization counseling done.  - Weight loss counseling done.  - Nutrition and exercise " counseling.  - Limitation of alcohol consumption.  - Regular exercise:  Aerobic and resistance.  - Medication counseling provided.      Follow up in about 4 months (around 1/5/2024).    Method of contact with patient concerns: [facility]    Disclaimer:  This note is prepared using voice recognition software and as such is likely to have errors and has not been proof read. Please contact me for questions.     Time spent: 30 minutes in face to face discussion concerning diagnosis, prognosis, review of lab and test results, benefits of treatment as well as management of disease, counseling of patient and coordination of care between various health care providers.  Greater than half the time spent was used for coordination of care and counseling of patient.    Brandon Bonilla M.D.  Rheumatology Department   [facility]

## 2023-09-05 NOTE — PROGRESS NOTES
Answers submitted by the patient for this visit:  Rheumatology Questionnaire (Submitted on 9/2/2023)  fever: No  eye redness: No  mouth sores: No  headaches: No  shortness of breath: No  chest pain: No  trouble swallowing: No  diarrhea: No  constipation: No  unexpected weight change: No  genital sore: No  dysuria: No  During the last 3 days, have you had a skin rash?: No  Bruises or bleeds easily: No  cough: No

## 2023-11-20 ENCOUNTER — TELEPHONE (OUTPATIENT)
Dept: RHEUMATOLOGY | Facility: CLINIC | Age: 58
End: 2023-11-20
Payer: COMMERCIAL

## 2023-11-20 DIAGNOSIS — M06.9 RHEUMATOID ARTHRITIS, INVOLVING UNSPECIFIED SITE, UNSPECIFIED WHETHER RHEUMATOID FACTOR PRESENT: ICD-10-CM

## 2023-11-20 DIAGNOSIS — M15.9 PRIMARY OSTEOARTHRITIS INVOLVING MULTIPLE JOINTS: ICD-10-CM

## 2023-11-20 DIAGNOSIS — M79.7 FIBROMYALGIA: ICD-10-CM

## 2023-11-20 DIAGNOSIS — M06.9 RHEUMATOID ARTHRITIS, INVOLVING UNSPECIFIED SITE, UNSPECIFIED WHETHER RHEUMATOID FACTOR PRESENT: Primary | ICD-10-CM

## 2023-11-20 RX ORDER — TRAMADOL HYDROCHLORIDE 50 MG/1
50 TABLET ORAL
Qty: 30 TABLET | Refills: 0 | Status: SHIPPED | OUTPATIENT
Start: 2023-11-20 | End: 2024-03-04 | Stop reason: SDUPTHER

## 2023-11-20 RX ORDER — CYCLOBENZAPRINE HCL 5 MG
5 TABLET ORAL 3 TIMES DAILY PRN
Qty: 90 TABLET | Refills: 1 | Status: SHIPPED | OUTPATIENT
Start: 2023-11-20 | End: 2024-03-04 | Stop reason: SDUPTHER

## 2023-11-20 RX ORDER — DULOXETIN HYDROCHLORIDE 60 MG/1
60 CAPSULE, DELAYED RELEASE ORAL DAILY
Qty: 90 CAPSULE | Refills: 1 | Status: SHIPPED | OUTPATIENT
Start: 2023-11-20 | End: 2024-03-04 | Stop reason: SDUPTHER

## 2023-11-20 RX ORDER — NAPROXEN 500 MG/1
500 TABLET ORAL 2 TIMES DAILY PRN
Qty: 180 TABLET | Refills: 1 | Status: SHIPPED | OUTPATIENT
Start: 2023-11-20 | End: 2024-03-04 | Stop reason: SDUPTHER

## 2023-11-20 RX ORDER — LEFLUNOMIDE 20 MG/1
20 TABLET ORAL DAILY
Qty: 90 TABLET | Refills: 0 | Status: SHIPPED | OUTPATIENT
Start: 2023-11-20 | End: 2024-03-04 | Stop reason: SDUPTHER

## 2023-11-20 RX ORDER — PREDNISONE 2.5 MG/1
2.5 TABLET ORAL
Qty: 90 TABLET | Refills: 1 | Status: SHIPPED | OUTPATIENT
Start: 2023-11-20 | End: 2024-03-04 | Stop reason: SDUPTHER

## 2023-11-20 NOTE — TELEPHONE ENCOUNTER
----- Message from Vero Mcnamara sent at 11/17/2023  4:46 PM CST -----  Type:  RX Refill Request    Who Called:        Disp Refills Start End KOMAL  leflunomide (ARAVA) 20 MG Tab 90 tablet 3 9/5/2023 - No  Sig - Route: Take 1 tablet (20 mg total) by mouth once daily. - Oral  Sent to pharmacy as: leflunomide (ARAVA) 20 MG Tab  Class: Normal  Order: [id number]  Date/Time Signed: 9/5/2023 09:10      E-Prescribing Status: Receipt confirmed by pharmacy (9/5/2023  9:21 AM CDT)     Disp Refills Start End KOMAL  DULoxetine (CYMBALTA) 60 MG capsule 90 capsule 1 9/5/2023 - No  Sig - Route: Take 1 capsule (60 mg total) by mouth once daily. - Oral  Sent to pharmacy as: DULoxetine (CYMBALTA) 60 MG capsule  Class: Normal  Order: [id number]  Date/Time Signed: 9/5/2023 09:10      E-Prescribing Status: Receipt confirmed by pharmacy (9/5/2023  9:21 AM CDT)    naproxen (NAPROSYN) 500 MG tablet 180 tablet 3 9/5/2023 - No  Sig - Route: Take 1 tablet (500 mg total) by mouth 2 (two) times daily with meals. - Oral  Sent to pharmacy as: naproxen (NAPROSYN) 500 MG tablet  Class: Normal  Order: [id number]  Date/Time Signed: 9/5/2023 09:10      E-Prescribing Status: Receipt confirmed by pharmacy (9/5/2023  9:21 AM CDT)     Disp Refills Start End KOMAL  predniSONE (DELTASONE) 2.5 MG tablet 90 tablet 1 9/5/2023 - No  Sig - Route: Take 1 tablet (2.5 mg total) by mouth as needed (flares). - Oral  Sent to pharmacy as: predniSONE (DELTASONE) 2.5 MG tablet  Class: Normal  Order: [id number]  Date/Time Signed: 9/5/2023 09:10      E-Prescribing Status: Receipt confirmed by pharmacy (9/5/2023  9:21 AM CDT)         Disp Refills Start End KOMAL  traMADoL (ULTRAM) 50 mg tablet 30 tablet 5 9/5/2023 - No  Sig - Route: Take 1 tablet (50 mg total) by mouth every 24 hours as needed for Pain. - Oral  Sent to pharmacy as: traMADoL (ULTRAM) 50 mg tablet  Class: Normal  Notes to Pharmacy: Quantity prescribed more than 7 day supply? Yes, quantity medically necessary  Order:  [id number]  Date/Time Signed: 9/5/2023 09:10      E-Prescribing Status: Receipt confirmed by pharmacy (9/5/2023  9:21 AM CDT)  Prior authorization: Approved       Disp Refills Start End KOMAL  cyclobenzaprine (FLEXERIL) 5 MG tablet 90 tablet 3 9/5/2023 - No  Sig - Route: Take 1 tablet (5 mg total) by mouth 3 (three) times daily as needed for Muscle spasms. - Oral  Sent to pharmacy as: cyclobenzaprine (FLEXERIL) 5 MG tablet  Class: Normal  Order: [id number]  Date/Time Signed: 9/5/2023 09:10      E-Prescribing Status: Receipt confirmed by pharmacy (9/5/2023  9:21 AM CDT)    Pharmacy  [facility] - [address]   Associated Diagnoses      Rheumatoid arthritis, involving unspecified site, unspecified whether rheumatoid factor present  - Primary    would the patient rather a call back or a response via [facility]?   Best Call Back Number:[phone] (M)  Additional Information: pt sched with Dr Galvan for March. Her appt with Dr Bonilla was cx. Pt would like refills before he leave to hold her until March.

## 2023-11-21 NOTE — TELEPHONE ENCOUNTER
Please schedule labs due 12/5/23 and please schedule f/u with another rheumatologist, Dr Huerta in [place] Thank you LEESA

## 2023-11-21 NOTE — TELEPHONE ENCOUNTER
I schedule labs for 12/07/2023 and also has an appointment with Dr. Galvan in March. Patient voices understanding.

## 2023-12-06 ENCOUNTER — LAB VISIT (OUTPATIENT)
Dept: LAB | Facility: HOSPITAL | Age: 58
End: 2023-12-06
Attending: INTERNAL MEDICINE
Payer: COMMERCIAL

## 2023-12-06 DIAGNOSIS — M06.9 RHEUMATOID ARTHRITIS, INVOLVING UNSPECIFIED SITE, UNSPECIFIED WHETHER RHEUMATOID FACTOR PRESENT: ICD-10-CM

## 2023-12-06 LAB
ALBUMIN SERPL BCP-MCNC: 3.7 G/DL (ref 3.5–5.2)
ALP SERPL-CCNC: 115 U/L (ref 55–135)
ALT SERPL W/O P-5'-P-CCNC: 21 U/L (ref 10–44)
ANION GAP SERPL CALC-SCNC: 9 MMOL/L (ref 8–16)
AST SERPL-CCNC: 18 U/L (ref 10–40)
BASOPHILS # BLD AUTO: 0.06 K/UL (ref 0–0.2)
BASOPHILS NFR BLD: 1 % (ref 0–1.9)
BILIRUB SERPL-MCNC: 0.3 MG/DL (ref 0.1–1)
BUN SERPL-MCNC: 18 MG/DL (ref 6–20)
CALCIUM SERPL-MCNC: 9.3 MG/DL (ref 8.7–10.5)
CHLORIDE SERPL-SCNC: 105 MMOL/L (ref 95–110)
CO2 SERPL-SCNC: 28 MMOL/L (ref 23–29)
CREAT SERPL-MCNC: 0.9 MG/DL (ref 0.5–1.4)
CRP SERPL-MCNC: 3.6 MG/L (ref 0–8.2)
DIFFERENTIAL METHOD: ABNORMAL
EOSINOPHIL # BLD AUTO: 0.2 K/UL (ref 0–0.5)
EOSINOPHIL NFR BLD: 3.6 % (ref 0–8)
ERYTHROCYTE [DISTWIDTH] IN BLOOD BY AUTOMATED COUNT: 13.2 % (ref 11.5–14.5)
EST. GFR  (NO RACE VARIABLE): >60 ML/MIN/1.73 M^2
GLUCOSE SERPL-MCNC: 83 MG/DL (ref 70–110)
HCT VFR BLD AUTO: 37.1 % (ref 37–48.5)
HGB BLD-MCNC: 12.7 G/DL (ref 12–16)
IMM GRANULOCYTES # BLD AUTO: 0.01 K/UL (ref 0–0.04)
IMM GRANULOCYTES NFR BLD AUTO: 0.2 % (ref 0–0.5)
LYMPHOCYTES # BLD AUTO: 1.9 K/UL (ref 1–4.8)
LYMPHOCYTES NFR BLD: 32.1 % (ref 18–48)
MCH RBC QN AUTO: 32.4 PG (ref 27–31)
MCHC RBC AUTO-ENTMCNC: 34.2 G/DL (ref 32–36)
MCV RBC AUTO: 95 FL (ref 82–98)
MONOCYTES # BLD AUTO: 0.7 K/UL (ref 0.3–1)
MONOCYTES NFR BLD: 12.4 % (ref 4–15)
NEUTROPHILS # BLD AUTO: 3 K/UL (ref 1.8–7.7)
NEUTROPHILS NFR BLD: 50.7 % (ref 38–73)
NRBC BLD-RTO: 0 /100 WBC
PLATELET # BLD AUTO: 251 K/UL (ref 150–450)
PMV BLD AUTO: 12.3 FL (ref 9.2–12.9)
POTASSIUM SERPL-SCNC: 3.8 MMOL/L (ref 3.5–5.1)
PROT SERPL-MCNC: 6.6 G/DL (ref 6–8.4)
RBC # BLD AUTO: 3.92 M/UL (ref 4–5.4)
SODIUM SERPL-SCNC: 142 MMOL/L (ref 136–145)
WBC # BLD AUTO: 5.88 K/UL (ref 3.9–12.7)

## 2023-12-06 PROCEDURE — 85025 COMPLETE CBC W/AUTO DIFF WBC: CPT | Performed by: INTERNAL MEDICINE

## 2023-12-06 PROCEDURE — 80053 COMPREHEN METABOLIC PANEL: CPT | Performed by: INTERNAL MEDICINE

## 2023-12-06 PROCEDURE — 86140 C-REACTIVE PROTEIN: CPT | Performed by: INTERNAL MEDICINE

## 2023-12-06 PROCEDURE — 36415 COLL VENOUS BLD VENIPUNCTURE: CPT | Mod: PO | Performed by: INTERNAL MEDICINE

## 2023-12-06 PROCEDURE — 85652 RBC SED RATE AUTOMATED: CPT | Performed by: INTERNAL MEDICINE

## 2023-12-07 LAB — ERYTHROCYTE [SEDIMENTATION RATE] IN BLOOD BY PHOTOMETRIC METHOD: 20 MM/HR (ref 0–36)

## 2024-02-01 ENCOUNTER — TELEPHONE (OUTPATIENT)
Dept: RHEUMATOLOGY | Facility: CLINIC | Age: 59
End: 2024-02-01
Payer: COMMERCIAL

## 2024-02-01 NOTE — TELEPHONE ENCOUNTER
----- Message from Kyra Martin sent at 1/31/2024  4:24 PM CST -----    ----- Message -----  From: Dee Dee Bray LPN  Sent: 1/31/2024   4:19 PM CST  To: Kyra Collado,       Please forwarded to ira Cruz for assistance.     Thank you,   Dee Dee   ----- Message -----  From: Kyra Martin  Sent: 1/31/2024   4:13 PM CST  To: Agustín REARDON Staff    Type:  Needs Medical Advice    Who Called: pt  Would the patient rather a call back or a response via [facility]? call  Best Call Back Number:  [phone]  Additional Information: pt would like to speak with ira regarding scheduling with rochelle and a new doctor since capps left never heard anything back

## 2024-03-04 ENCOUNTER — LAB VISIT (OUTPATIENT)
Dept: LAB | Facility: HOSPITAL | Age: 59
End: 2024-03-04
Attending: STUDENT IN AN ORGANIZED HEALTH CARE EDUCATION/TRAINING PROGRAM
Payer: COMMERCIAL

## 2024-03-04 ENCOUNTER — OFFICE VISIT (OUTPATIENT)
Dept: RHEUMATOLOGY | Facility: CLINIC | Age: 59
End: 2024-03-04
Payer: COMMERCIAL

## 2024-03-04 VITALS
HEART RATE: 80 BPM | BODY MASS INDEX: 24.62 KG/M2 | DIASTOLIC BLOOD PRESSURE: 73 MMHG | WEIGHT: 122.13 LBS | SYSTOLIC BLOOD PRESSURE: 113 MMHG | HEIGHT: 59 IN

## 2024-03-04 DIAGNOSIS — M15.9 PRIMARY OSTEOARTHRITIS INVOLVING MULTIPLE JOINTS: ICD-10-CM

## 2024-03-04 DIAGNOSIS — Z79.60 LONG-TERM USE OF IMMUNOSUPPRESSANT MEDICATION: ICD-10-CM

## 2024-03-04 DIAGNOSIS — Z79.1 NSAID LONG-TERM USE: ICD-10-CM

## 2024-03-04 DIAGNOSIS — M06.9 RHEUMATOID ARTHRITIS, INVOLVING UNSPECIFIED SITE, UNSPECIFIED WHETHER RHEUMATOID FACTOR PRESENT: ICD-10-CM

## 2024-03-04 DIAGNOSIS — M06.9 RHEUMATOID ARTHRITIS, INVOLVING UNSPECIFIED SITE, UNSPECIFIED WHETHER RHEUMATOID FACTOR PRESENT: Primary | ICD-10-CM

## 2024-03-04 DIAGNOSIS — M79.7 FIBROMYALGIA: ICD-10-CM

## 2024-03-04 PROBLEM — M15.0 PRIMARY OSTEOARTHRITIS INVOLVING MULTIPLE JOINTS: Status: ACTIVE | Noted: 2024-03-04

## 2024-03-04 LAB
ALBUMIN SERPL BCP-MCNC: 3.7 G/DL (ref 3.5–5.2)
ALP SERPL-CCNC: 92 U/L (ref 55–135)
ALT SERPL W/O P-5'-P-CCNC: 23 U/L (ref 10–44)
ANION GAP SERPL CALC-SCNC: 11 MMOL/L (ref 8–16)
AST SERPL-CCNC: 20 U/L (ref 10–40)
BASOPHILS # BLD AUTO: 0.08 K/UL (ref 0–0.2)
BASOPHILS NFR BLD: 1.3 % (ref 0–1.9)
BILIRUB SERPL-MCNC: 0.4 MG/DL (ref 0.1–1)
BUN SERPL-MCNC: 19 MG/DL (ref 6–20)
CALCIUM SERPL-MCNC: 9.4 MG/DL (ref 8.7–10.5)
CHLORIDE SERPL-SCNC: 106 MMOL/L (ref 95–110)
CO2 SERPL-SCNC: 25 MMOL/L (ref 23–29)
CREAT SERPL-MCNC: 0.8 MG/DL (ref 0.5–1.4)
CRP SERPL-MCNC: 3.6 MG/L (ref 0–8.2)
DIFFERENTIAL METHOD BLD: NORMAL
EOSINOPHIL # BLD AUTO: 0.3 K/UL (ref 0–0.5)
EOSINOPHIL NFR BLD: 4 % (ref 0–8)
ERYTHROCYTE [DISTWIDTH] IN BLOOD BY AUTOMATED COUNT: 13.1 % (ref 11.5–14.5)
ERYTHROCYTE [SEDIMENTATION RATE] IN BLOOD BY PHOTOMETRIC METHOD: 17 MM/HR (ref 0–36)
EST. GFR  (NO RACE VARIABLE): >60 ML/MIN/1.73 M^2
GLUCOSE SERPL-MCNC: 90 MG/DL (ref 70–110)
HCT VFR BLD AUTO: 41.1 % (ref 37–48.5)
HGB BLD-MCNC: 13.6 G/DL (ref 12–16)
IMM GRANULOCYTES # BLD AUTO: 0.01 K/UL (ref 0–0.04)
IMM GRANULOCYTES NFR BLD AUTO: 0.2 % (ref 0–0.5)
LYMPHOCYTES # BLD AUTO: 2.2 K/UL (ref 1–4.8)
LYMPHOCYTES NFR BLD: 35 % (ref 18–48)
MCH RBC QN AUTO: 30.4 PG (ref 27–31)
MCHC RBC AUTO-ENTMCNC: 33.1 G/DL (ref 32–36)
MCV RBC AUTO: 92 FL (ref 82–98)
MONOCYTES # BLD AUTO: 0.9 K/UL (ref 0.3–1)
MONOCYTES NFR BLD: 13.5 % (ref 4–15)
NEUTROPHILS # BLD AUTO: 2.9 K/UL (ref 1.8–7.7)
NEUTROPHILS NFR BLD: 46 % (ref 38–73)
NRBC BLD-RTO: 0 /100 WBC
PLATELET # BLD AUTO: 282 K/UL (ref 150–450)
PMV BLD AUTO: 11.5 FL (ref 9.2–12.9)
POTASSIUM SERPL-SCNC: 4.4 MMOL/L (ref 3.5–5.1)
PROT SERPL-MCNC: 7 G/DL (ref 6–8.4)
RBC # BLD AUTO: 4.48 M/UL (ref 4–5.4)
SODIUM SERPL-SCNC: 142 MMOL/L (ref 136–145)
WBC # BLD AUTO: 6.28 K/UL (ref 3.9–12.7)

## 2024-03-04 PROCEDURE — 85652 RBC SED RATE AUTOMATED: CPT | Performed by: STUDENT IN AN ORGANIZED HEALTH CARE EDUCATION/TRAINING PROGRAM

## 2024-03-04 PROCEDURE — 80053 COMPREHEN METABOLIC PANEL: CPT | Performed by: STUDENT IN AN ORGANIZED HEALTH CARE EDUCATION/TRAINING PROGRAM

## 2024-03-04 PROCEDURE — 36415 COLL VENOUS BLD VENIPUNCTURE: CPT | Performed by: STUDENT IN AN ORGANIZED HEALTH CARE EDUCATION/TRAINING PROGRAM

## 2024-03-04 PROCEDURE — 85025 COMPLETE CBC W/AUTO DIFF WBC: CPT | Performed by: STUDENT IN AN ORGANIZED HEALTH CARE EDUCATION/TRAINING PROGRAM

## 2024-03-04 PROCEDURE — 99215 OFFICE O/P EST HI 40 MIN: CPT | Mod: S$GLB,,, | Performed by: STUDENT IN AN ORGANIZED HEALTH CARE EDUCATION/TRAINING PROGRAM

## 2024-03-04 PROCEDURE — 86140 C-REACTIVE PROTEIN: CPT | Performed by: STUDENT IN AN ORGANIZED HEALTH CARE EDUCATION/TRAINING PROGRAM

## 2024-03-04 PROCEDURE — 99999 PR PBB SHADOW E&M-EST. PATIENT-LVL III: CPT | Mod: PBBFAC,,, | Performed by: STUDENT IN AN ORGANIZED HEALTH CARE EDUCATION/TRAINING PROGRAM

## 2024-03-04 RX ORDER — CYCLOBENZAPRINE HCL 5 MG
5 TABLET ORAL 3 TIMES DAILY PRN
Qty: 90 TABLET | Refills: 1 | Status: SHIPPED | OUTPATIENT
Start: 2024-03-04

## 2024-03-04 RX ORDER — TRAMADOL HYDROCHLORIDE 50 MG/1
50 TABLET ORAL
Qty: 30 TABLET | Refills: 0 | Status: SHIPPED | OUTPATIENT
Start: 2024-03-04

## 2024-03-04 RX ORDER — LEFLUNOMIDE 20 MG/1
20 TABLET ORAL DAILY
Qty: 90 TABLET | Refills: 0 | Status: SHIPPED | OUTPATIENT
Start: 2024-03-04

## 2024-03-04 RX ORDER — PREDNISONE 2.5 MG/1
2.5 TABLET ORAL
Qty: 90 TABLET | Refills: 1 | Status: SHIPPED | OUTPATIENT
Start: 2024-03-04

## 2024-03-04 RX ORDER — NAPROXEN 500 MG/1
500 TABLET ORAL 2 TIMES DAILY PRN
Qty: 180 TABLET | Refills: 1 | Status: SHIPPED | OUTPATIENT
Start: 2024-03-04

## 2024-03-04 RX ORDER — DULOXETIN HYDROCHLORIDE 60 MG/1
60 CAPSULE, DELAYED RELEASE ORAL DAILY
Qty: 90 CAPSULE | Refills: 1 | Status: SHIPPED | OUTPATIENT
Start: 2024-03-04

## 2024-03-04 RX ORDER — GABAPENTIN 600 MG/1
600 TABLET ORAL NIGHTLY
Qty: 90 TABLET | Refills: 1 | Status: SHIPPED | OUTPATIENT
Start: 2024-03-04

## 2024-03-04 NOTE — PROGRESS NOTES
"       RHEUMATOLOGY OUTPATIENT CLINIC NOTE    3/4/2024    Attending Rheumatologist: Deedee Anguiano  Primary Care Provider: No, Primary Doctor   Physician Requesting Consultation: No referring provider defined for this encounter.  Chief Complaint/Reason For Consultation:  RA      Subjective:       HPI  Tabby Garcia is a 58 y.o. Unknown female with medical history noted below who presents for evaluation of RA.   Patient presents for evaluation of arthritis. She notes diagnosis RA, FM, and Peripheral Neuropathy since 2000, patient of Dr. Hensley. Last seen by Rheum in 11/2022, Dr. HALE.   RA Meds- LEF 20mg (current). Prior Meds: Remicade, MTX, HCQ, Humira  FM-Cymbalta, Flexeril, Naproxen, Gabapentin  PN-Gabapentin  Uses PDN 2.5mg PRN & Tramadol PRN flares.   She reports there are good days and bad days, notes worse times is with weather changes, otherwise stable. No joint swelling. +Morning stiffness, fatigue, burning tingling of extremities.     Last appt  Patient here for follow up.   Last seen January 2023, at which point management continued. Had letter in Media saying Tramadol given by Dr. HALE, but since left I have provided. She notes using PRN with relief makes her feel like a "zombie". Does endorse joint pain in her hands and knees, sometimes swelling of PIP, notes it changes based on what she does. Otherwise stable, tolerating meds.     Today   Patient here for follow up of RA   She is currently on Leflunomide and prednisone prn (she reports takes 2-3 times a week).   She is taking Gabapentin and flexeril but not all the time because makes her feel like a zombie. She feels Naproxen is the medication that helps the most. She peoples snot want to do infusions or injectable medications.   She had her brother in law on home hospice and she was taking care of him- he passed 2 weeks ago- her pain exacerbated during this days.     Review of Systems   Constitutional:  Positive for fatigue. Negative for chills, " fever and unexpected weight change.   HENT:  Negative for mouth sores and trouble swallowing.    Eyes:  Negative for redness and eye dryness.   Respiratory:  Negative for cough and shortness of breath.    Cardiovascular:  Negative for chest pain.   Gastrointestinal:  Negative for abdominal distention, constipation, diarrhea, nausea and vomiting.   Genitourinary:  Negative for dysuria, genital sores and vaginal dryness.   Musculoskeletal:  Positive for arthralgias, myalgias, neck pain and neck stiffness. Negative for back pain, gait problem, joint swelling, leg pain and joint deformity.   Integumentary:  Negative for rash.   Neurological:  Positive for numbness. Negative for weakness and headaches.   Hematological:  Negative for adenopathy. Does not bruise/bleed easily.   Psychiatric/Behavioral:  Positive for sleep disturbance. Negative for confusion and decreased concentration. The patient is not nervous/anxious.    All other systems reviewed and are negative.       Chronic comorbid conditions affecting medical decision making today:  Past Medical History:   Diagnosis Date    Amenorrhea 2016    started menopause    Dyspareunia 2016    started when I began menopause    Fibromyalgia     Infertility, female     Rheumatoid arthritis, unspecified      History reviewed. No pertinent surgical history.  Family History   Problem Relation Age of Onset    Breast cancer Maternal Grandmother         got breast cancer at 85 years old    Breast cancer Sister         had a double masectomy 2019    Ovarian cancer Paternal Aunt           from ovarian cancer     Social History     Substance and Sexual Activity   Alcohol Use Never     Social History     Tobacco Use   Smoking Status Never   Smokeless Tobacco Not on file     Social History     Substance and Sexual Activity   Drug Use Never       Current Outpatient Medications:     azelastine-fluticasone (DYMISTA) 137-50 mcg/spray Spry nassal spray, , Disp: , Rfl:      fluticasone propionate (FLONASE) 50 mcg/actuation nasal spray, 2 sprays by Nasal route., Disp: , Rfl:     cyclobenzaprine (FLEXERIL) 5 MG tablet, Take 1 tablet (5 mg total) by mouth 3 (three) times daily as needed for Muscle spasms., Disp: 90 tablet, Rfl: 1    JUVE 0.1 mg/24 hr PTSW, Place onto the skin., Disp: , Rfl:     DULoxetine (CYMBALTA) 60 MG capsule, Take 1 capsule (60 mg total) by mouth once daily., Disp: 90 capsule, Rfl: 1    gabapentin (NEURONTIN) 600 MG tablet, Take 1 tablet (600 mg total) by mouth every evening., Disp: 90 tablet, Rfl: 1    leflunomide (ARAVA) 20 MG Tab, Take 1 tablet (20 mg total) by mouth once daily., Disp: 90 tablet, Rfl: 0    medroxyPROGESTERone (PROVERA) 2.5 MG tablet, Take 2.5 mg by mouth., Disp: , Rfl:     naproxen (NAPROSYN) 500 MG tablet, Take 1 tablet (500 mg total) by mouth 2 (two) times daily as needed., Disp: 180 tablet, Rfl: 1    predniSONE (DELTASONE) 2.5 MG tablet, Take 1 tablet (2.5 mg total) by mouth as needed (flares)., Disp: 90 tablet, Rfl: 1    traMADoL (ULTRAM) 50 mg tablet, Take 1 tablet (50 mg total) by mouth every 24 hours as needed for Pain., Disp: 30 tablet, Rfl: 0     Objective:         Vitals:    03/04/24 0820   BP: 113/73   Pulse: 80     Physical Exam  Can make fist, no synovitis, TTP over MCP/PIP/DIP  Heberden and chente nodes  Knee crepitus  Negative ankle/MTP  Tender Points:  No   Yes  []    [x]   Low cervical anterior aspect    []    [x]   Costochondral Junction   []    [x]   Lateral Epicondyle  []    [x]   Suboccipital   []    [x]   Trapezius   []    [x]   Supraspinatus   []    [x]   Gluteal   []    [x]   Greater trochanter  []    [x]   Knee    Reviewed old and all outside pertinent medical records available.    All lab results personally reviewed and interpreted by me.  Lab Results   Component Value Date    WBC 5.88 12/06/2023    HGB 12.7 12/06/2023    HCT 37.1 12/06/2023    MCV 95 12/06/2023    MCH 32.4 (H) 12/06/2023    MCHC 34.2 12/06/2023    RDW  "13.2 12/06/2023     12/06/2023    MPV 12.3 12/06/2023       Lab Results   Component Value Date     12/06/2023    K 3.8 12/06/2023     12/06/2023    CO2 28 12/06/2023    GLU 83 12/06/2023    BUN 18 12/06/2023    CALCIUM 9.3 12/06/2023    PROT 6.6 12/06/2023    ALBUMIN 3.7 12/06/2023    BILITOT 0.3 12/06/2023    AST 18 12/06/2023    ALKPHOS 115 12/06/2023    ALT 21 12/06/2023       No results found for: "COLORU", "APPEARANCEUA", "SPECGRAV", "PHUR", "PHUA", "PROTEINUA", "GLUCOSEU", "KETONESU", "BLOODU", "LEUKOCYTESUR", "NITRITE", "UROBILINOGEN"    Lab Results   Component Value Date    CRP 3.6 12/06/2023       Lab Results   Component Value Date    SEDRATE 20 12/06/2023       Lab Results   Component Value Date    SEDRATE 20 12/06/2023       No components found for: "25OHVITDTOT", "67HKIHIG4", "86IEPBKT1", "METHODNOTE"    No results found for: "URICACID"    No components found for: "TSPOTTB"        Imaging:  All imaging reviewed and independently interpreted by me.         ASSESSMENT / PLAN:     Tabby Garcia is a 58 y.o. Unknown female with:      1. Rheumatoid arthritis, involving unspecified site, unspecified whether rheumatoid factor present  - patient with Known RA  - has some TTP, will check inflammatory markers, can be from OA, monitor   - continue LEF 20mg  - PDN PRN flares  - update labs  - reassurance     2. Fibromyalgia  - stable  - continue Naproxen, Gabapentin, Cymbalta, Flexeril  - sleep hygiene and stress management reinforced   - reassurance and exercise   - Exacerbated by recent death of family member  - Tramadol prn,  reviewed     3. Primary osteoarthritis involving multiple joints  - chronic  - Tramadol PRN,  reviewed  - reassurance and exercise    4. Peripheral polyneuropathy  - stable  - continue Gabapentin   - gabapentin (NEURONTIN) 600 MG tablet; Take 1 tablet (600 mg total) by mouth once daily.  Dispense: 90 tablet; Refill: 3    5. Immunodeficiency due to treatment " with immunosuppressive medication  - quarterly labs  - vaccines per guidelines   - immunosuppression/infectious precautions discussed     6. Chronic NSAID use  - no history of GI bleed or coronary artery disease.  - previous labs without features of CKD.  - clinical significance side effect of long-term NSAID use discussed in detail.  - recommended for alternative therapies for pain management.      7. Other specified counseling  - over 10 minutes spent regarding below topics:  - Immunization counseling done.  - Weight loss counseling done.  - Nutrition and exercise counseling.  - Limitation of alcohol consumption.  - Regular exercise:  Aerobic and resistance.  - Medication counseling provided.      Follow up in about 3 months (around 6/4/2024).    Method of contact with patient concerns: [facility]    Time spent: 40 minutes in face to face discussion concerning diagnosis, prognosis, review of lab and test results, benefits of treatment as well as management of disease, counseling of patient and coordination of care between various health care providers.  Greater than half the time spent was used for coordination of care and counseling of patient.    Deedee Anguiano M.D.  Rheumatology Department   [facility]

## 2024-06-04 ENCOUNTER — OFFICE VISIT (OUTPATIENT)
Dept: RHEUMATOLOGY | Facility: CLINIC | Age: 59
End: 2024-06-04
Payer: COMMERCIAL

## 2024-06-04 ENCOUNTER — HOSPITAL ENCOUNTER (OUTPATIENT)
Dept: RADIOLOGY | Facility: HOSPITAL | Age: 59
Discharge: HOME | End: 2024-06-04
Attending: STUDENT IN AN ORGANIZED HEALTH CARE EDUCATION/TRAINING PROGRAM
Payer: COMMERCIAL

## 2024-06-04 VITALS
DIASTOLIC BLOOD PRESSURE: 72 MMHG | HEIGHT: 58 IN | OXYGEN SATURATION: 99 % | WEIGHT: 124.31 LBS | TEMPERATURE: 98 F | RESPIRATION RATE: 18 BRPM | SYSTOLIC BLOOD PRESSURE: 109 MMHG | BODY MASS INDEX: 26.1 KG/M2 | HEART RATE: 74 BPM

## 2024-06-04 DIAGNOSIS — M06.9 RHEUMATOID ARTHRITIS, INVOLVING UNSPECIFIED SITE, UNSPECIFIED WHETHER RHEUMATOID FACTOR PRESENT: ICD-10-CM

## 2024-06-04 DIAGNOSIS — Z79.1 NSAID LONG-TERM USE: Primary | ICD-10-CM

## 2024-06-04 DIAGNOSIS — M15.9 PRIMARY OSTEOARTHRITIS INVOLVING MULTIPLE JOINTS: ICD-10-CM

## 2024-06-04 DIAGNOSIS — Z79.60 LONG-TERM USE OF IMMUNOSUPPRESSANT MEDICATION: ICD-10-CM

## 2024-06-04 DIAGNOSIS — Z71.89 COUNSELING AND COORDINATION OF CARE: ICD-10-CM

## 2024-06-04 DIAGNOSIS — Z79.899 IMMUNODEFICIENCY DUE TO TREATMENT WITH IMMUNOSUPPRESSIVE MEDICATION: ICD-10-CM

## 2024-06-04 DIAGNOSIS — M79.7 FIBROMYALGIA: ICD-10-CM

## 2024-06-04 DIAGNOSIS — D84.821 IMMUNODEFICIENCY DUE TO TREATMENT WITH IMMUNOSUPPRESSIVE MEDICATION: ICD-10-CM

## 2024-06-04 PROBLEM — T45.1X5A IMMUNODEFICIENCY DUE TO TREATMENT WITH IMMUNOSUPPRESSIVE MEDICATION: Status: ACTIVE | Noted: 2024-06-04

## 2024-06-04 PROCEDURE — 99999 PR PBB SHADOW E&M-EST. PATIENT-LVL III: CPT | Mod: PBBFAC,,, | Performed by: STUDENT IN AN ORGANIZED HEALTH CARE EDUCATION/TRAINING PROGRAM

## 2024-06-04 PROCEDURE — 73130 X-RAY EXAM OF HAND: CPT | Mod: 26,50,, | Performed by: INTERNAL MEDICINE

## 2024-06-04 PROCEDURE — 99214 OFFICE O/P EST MOD 30 MIN: CPT | Mod: S$GLB,,, | Performed by: STUDENT IN AN ORGANIZED HEALTH CARE EDUCATION/TRAINING PROGRAM

## 2024-06-04 PROCEDURE — 73130 X-RAY EXAM OF HAND: CPT | Mod: TC,50,FY

## 2024-06-04 NOTE — PROGRESS NOTES
"       RHEUMATOLOGY OUTPATIENT CLINIC NOTE    6/4/2024    Attending Rheumatologist: Deedee Anguiano  Primary Care Provider: No, Primary Doctor   Physician Requesting Consultation: No referring provider defined for this encounter.  Chief Complaint/Reason For Consultation:  Rheumatoid Arthritis      Subjective:       HPI  Tabby Garcia is a 58 y.o. Unknown female with medical history noted below who presents for evaluation of RA.   Patient presents for evaluation of arthritis. She notes diagnosis RA, FM, and Peripheral Neuropathy since 2000, patient of Dr. Hensley. Last seen by Rheum in 11/2022, Dr. HALE.   RA Meds- LEF 20mg (current). Prior Meds: Remicade, MTX, HCQ, Humira  FM-Cymbalta, Flexeril, Naproxen, Gabapentin  PN-Gabapentin  Uses PDN 2.5mg PRN & Tramadol PRN flares.   She reports there are good days and bad days, notes worse times is with weather changes, otherwise stable. No joint swelling. +Morning stiffness, fatigue, burning tingling of extremities.     Last appt  Patient here for follow up.   Last seen January 2023, at which point management continued. Had letter in [employer] saying Tramadol given by Dr. HALE, but since left I have provided. She notes using PRN with relief makes her feel like a "zombie". Does endorse joint pain in her hands and knees, sometimes swelling of PIP, notes it changes based on what she does. Otherwise stable, tolerating meds.     3/2024   Patient here for follow up of RA   She is currently on Leflunomide and prednisone prn (she reports takes 2-3 times a week).   She is taking Gabapentin and flexeril but not all the time because makes her feel like a zombie. She feels Naproxen is the medication that helps the most. She peoples snot want to do infusions or injectable medications.   She had her brother in law on home hospice and she was taking care of him- he passed 2 weeks ago- her pain exacerbated during this days.     6/2024:  Patient here for follow up of RA   Patient still taking " Leflunomide - has bee taking it for 15 yrs. Doing well. Having flare ups every month but subsides quickly- does not wish to change medications     Review of Systems   Constitutional:  Positive for fatigue. Negative for chills, fever and unexpected weight change.   HENT:  Negative for mouth sores and trouble swallowing.    Eyes:  Negative for redness and eye dryness.   Respiratory:  Negative for cough and shortness of breath.    Cardiovascular:  Negative for chest pain.   Gastrointestinal:  Negative for abdominal distention, constipation, diarrhea, nausea and vomiting.   Genitourinary:  Negative for dysuria, genital sores and vaginal dryness.   Musculoskeletal:  Positive for arthralgias, myalgias, neck pain and neck stiffness. Negative for back pain, gait problem, joint swelling, leg pain and joint deformity.   Integumentary:  Negative for rash.   Neurological:  Positive for numbness. Negative for weakness and headaches.   Hematological:  Negative for adenopathy. Does not bruise/bleed easily.   Psychiatric/Behavioral:  Positive for sleep disturbance. Negative for confusion and decreased concentration. The patient is not nervous/anxious.    All other systems reviewed and are negative.       Chronic comorbid conditions affecting medical decision making today:  Past Medical History:   Diagnosis Date    Amenorrhea 2016    started menopause    Dyspareunia 2016    started when I began menopause    Fibromyalgia     Infertility, female     Rheumatoid arthritis, unspecified      History reviewed. No pertinent surgical history.  Family History   Problem Relation Name Age of Onset    Breast cancer Maternal Grandmother Angela         got breast cancer at 85 years old    Breast cancer Sister Suad         had a double masectomy     Ovarian cancer Paternal Aunt Jaz           from ovarian cancer     Social History     Substance and Sexual Activity   Alcohol Use Never     Social History     Tobacco Use    Smoking Status Never   Smokeless Tobacco Not on file     Social History     Substance and Sexual Activity   Drug Use Never       Current Outpatient Medications:     azelastine-fluticasone (DYMISTA) 137-50 mcg/spray Spry nassal spray, , Disp: , Rfl:     cyclobenzaprine (FLEXERIL) 5 MG tablet, Take 1 tablet (5 mg total) by mouth 3 (three) times daily as needed for Muscle spasms., Disp: 90 tablet, Rfl: 1    DULoxetine (CYMBALTA) 60 MG capsule, Take 1 capsule (60 mg total) by mouth once daily., Disp: 90 capsule, Rfl: 1    fluticasone propionate (FLONASE) 50 mcg/actuation nasal spray, 2 sprays by Nasal route., Disp: , Rfl:     gabapentin (NEURONTIN) 600 MG tablet, Take 1 tablet (600 mg total) by mouth every evening., Disp: 90 tablet, Rfl: 1    leflunomide (ARAVA) 20 MG Tab, Take 1 tablet (20 mg total) by mouth once daily., Disp: 90 tablet, Rfl: 0    naproxen (NAPROSYN) 500 MG tablet, Take 1 tablet (500 mg total) by mouth 2 (two) times daily as needed., Disp: 180 tablet, Rfl: 1    predniSONE (DELTASONE) 2.5 MG tablet, Take 1 tablet (2.5 mg total) by mouth as needed (flares)., Disp: 90 tablet, Rfl: 1    traMADoL (ULTRAM) 50 mg tablet, Take 1 tablet (50 mg total) by mouth every 24 hours as needed for Pain., Disp: 30 tablet, Rfl: 0     Objective:         Vitals:    06/04/24 0856   BP: 109/72   Pulse: 74   Resp: 18   Temp: 97.9 °F (36.6 °C)       Physical Exam  Can make fist, no synovitis, TTP over MCP/PIP/DIP  Heberden and chente nodes  Knee crepitus  Negative ankle/MTP  Tender Points:  No   Yes  []    [x]   Low cervical anterior aspect    []    [x]   Costochondral Junction   []    [x]   Lateral Epicondyle  []    [x]   Suboccipital   []    [x]   Trapezius   []    [x]   Supraspinatus   []    [x]   Gluteal   []    [x]   Greater trochanter  []    [x]   Knee    Reviewed old and all outside pertinent medical records available.    All lab results personally reviewed and interpreted by me.  Lab Results   Component Value Date     "WBC 6.28 03/04/2024    HGB 13.6 03/04/2024    HCT 41.1 03/04/2024    MCV 92 03/04/2024    MCH 30.4 03/04/2024    MCHC 33.1 03/04/2024    RDW 13.1 03/04/2024     03/04/2024    MPV 11.5 03/04/2024       Lab Results   Component Value Date     03/04/2024    K 4.4 03/04/2024     03/04/2024    CO2 25 03/04/2024    GLU 90 03/04/2024    BUN 19 03/04/2024    CALCIUM 9.4 03/04/2024    PROT 7.0 03/04/2024    ALBUMIN 3.7 03/04/2024    BILITOT 0.4 03/04/2024    AST 20 03/04/2024    ALKPHOS 92 03/04/2024    ALT 23 03/04/2024       No results found for: "COLORU", "APPEARANCEUA", "SPECGRAV", "PHUR", "PHUA", "PROTEINUA", "GLUCOSEU", "KETONESU", "BLOODU", "LEUKOCYTESUR", "NITRITE", "UROBILINOGEN"    Lab Results   Component Value Date    CRP 3.6 03/04/2024       Lab Results   Component Value Date    SEDRATE 17 03/04/2024       Lab Results   Component Value Date    SEDRATE 17 03/04/2024             Imaging:  All imaging reviewed and independently interpreted by me.         ASSESSMENT / PLAN:     Tabby Garcia is a 58 y.o. Unknown female with:      1. Rheumatoid arthritis, involving unspecified site, unspecified whether rheumatoid factor present  - patient with Known RA  - Inflammatory markers are normal as well as CBC and CMP   - continue LEF 20mg  - PDN PRN flares  - update labs  - reassurance     2. Fibromyalgia  - stable  - continue Naproxen, Gabapentin, Cymbalta, Flexeril  - sleep hygiene and stress management reinforced   - reassurance and exercise   - Exacerbated by recent death of family member  - Tramadol prn,  reviewed     3. Primary osteoarthritis involving multiple joints  - chronic  - Tramadol PRN,  reviewed  - reassurance and exercise    4. Peripheral polyneuropathy  - stable  - continue Gabapentin   - gabapentin (NEURONTIN) 600 MG tablet; Take 1 tablet (600 mg total) by mouth once daily.  Dispense: 90 tablet; Refill: 3    5. Immunodeficiency due to treatment with immunosuppressive " medication  - quarterly labs  - vaccines per guidelines   - immunosuppression/infectious precautions discussed     6. Chronic NSAID use  - no history of GI bleed or coronary artery disease.  - previous labs without features of CKD.  - clinical significance side effect of long-term NSAID use discussed in detail.  - recommended for alternative therapies for pain management.      7. Other specified counseling  - over 10 minutes spent regarding below topics:  - Immunization counseling done.  - Weight loss counseling done.  - Nutrition and exercise counseling.  - Limitation of alcohol consumption.  - Regular exercise:  Aerobic and resistance.  - Medication counseling provided.      Follow up in about 6 months (around 12/4/2024).    Method of contact with patient concerns: [facility]    Time spent: 30 minutes in face to face discussion concerning diagnosis, prognosis, review of lab and test results, benefits of treatment as well as management of disease, counseling of patient and coordination of care between various health care providers.  Greater than half the time spent was used for coordination of care and counseling of patient.    Deedee Anguiano M.D.  Rheumatology Department   [facility]

## 2024-07-23 ENCOUNTER — TELEPHONE (OUTPATIENT)
Dept: RHEUMATOLOGY | Facility: CLINIC | Age: 59
End: 2024-07-23
Payer: COMMERCIAL

## 2024-07-23 NOTE — TELEPHONE ENCOUNTER
----- Message from Lia Cormier sent at 7/23/2024 10:56 AM CDT -----  Type:  Pt requesting call back     Who Called: Pt   Who Left Message for Patient:   that checked her in on 06/04  Does the patient know what this is regarding?: pt came few months ago on 06/04, would like to discuss billing entered wrong on chart, already discussed with billing department   Would the patient rather a call back or a response via [facility]? Call   Best Call Back Number: [phone]   Additional Information:

## 2024-11-29 ENCOUNTER — LAB VISIT (OUTPATIENT)
Dept: LAB | Facility: HOSPITAL | Age: 59
End: 2024-11-29
Attending: STUDENT IN AN ORGANIZED HEALTH CARE EDUCATION/TRAINING PROGRAM
Payer: COMMERCIAL

## 2024-11-29 DIAGNOSIS — M06.9 RHEUMATOID ARTHRITIS, INVOLVING UNSPECIFIED SITE, UNSPECIFIED WHETHER RHEUMATOID FACTOR PRESENT: ICD-10-CM

## 2024-11-29 LAB
ALBUMIN SERPL BCP-MCNC: 3.9 G/DL (ref 3.5–5.2)
ALP SERPL-CCNC: 115 U/L (ref 40–150)
ALT SERPL W/O P-5'-P-CCNC: 24 U/L (ref 10–44)
ANION GAP SERPL CALC-SCNC: 12 MMOL/L (ref 8–16)
AST SERPL-CCNC: 21 U/L (ref 10–40)
BASOPHILS # BLD AUTO: 0.08 K/UL (ref 0–0.2)
BASOPHILS NFR BLD: 1.2 % (ref 0–1.9)
BILIRUB SERPL-MCNC: 0.3 MG/DL (ref 0.1–1)
BUN SERPL-MCNC: 22 MG/DL (ref 6–20)
CALCIUM SERPL-MCNC: 9.5 MG/DL (ref 8.7–10.5)
CHLORIDE SERPL-SCNC: 108 MMOL/L (ref 95–110)
CO2 SERPL-SCNC: 22 MMOL/L (ref 23–29)
CREAT SERPL-MCNC: 0.9 MG/DL (ref 0.5–1.4)
CRP SERPL-MCNC: 3.7 MG/L (ref 0–8.2)
DIFFERENTIAL METHOD BLD: NORMAL
EOSINOPHIL # BLD AUTO: 0.3 K/UL (ref 0–0.5)
EOSINOPHIL NFR BLD: 3.9 % (ref 0–8)
ERYTHROCYTE [DISTWIDTH] IN BLOOD BY AUTOMATED COUNT: 13.6 % (ref 11.5–14.5)
ERYTHROCYTE [SEDIMENTATION RATE] IN BLOOD BY PHOTOMETRIC METHOD: 5 MM/HR (ref 0–36)
EST. GFR  (NO RACE VARIABLE): >60 ML/MIN/1.73 M^2
GLUCOSE SERPL-MCNC: 89 MG/DL (ref 70–110)
HCT VFR BLD AUTO: 39.8 % (ref 37–48.5)
HGB BLD-MCNC: 13.4 G/DL (ref 12–16)
IMM GRANULOCYTES # BLD AUTO: 0.02 K/UL (ref 0–0.04)
IMM GRANULOCYTES NFR BLD AUTO: 0.3 % (ref 0–0.5)
LYMPHOCYTES # BLD AUTO: 2 K/UL (ref 1–4.8)
LYMPHOCYTES NFR BLD: 31.2 % (ref 18–48)
MCH RBC QN AUTO: 30.9 PG (ref 27–31)
MCHC RBC AUTO-ENTMCNC: 33.7 G/DL (ref 32–36)
MCV RBC AUTO: 92 FL (ref 82–98)
MONOCYTES # BLD AUTO: 0.9 K/UL (ref 0.3–1)
MONOCYTES NFR BLD: 13.1 % (ref 4–15)
NEUTROPHILS # BLD AUTO: 3.3 K/UL (ref 1.8–7.7)
NEUTROPHILS NFR BLD: 50.3 % (ref 38–73)
NRBC BLD-RTO: 0 /100 WBC
PLATELET # BLD AUTO: 234 K/UL (ref 150–450)
PMV BLD AUTO: 11.9 FL (ref 9.2–12.9)
POTASSIUM SERPL-SCNC: 4.1 MMOL/L (ref 3.5–5.1)
PROT SERPL-MCNC: 7.2 G/DL (ref 6–8.4)
RBC # BLD AUTO: 4.34 M/UL (ref 4–5.4)
SODIUM SERPL-SCNC: 142 MMOL/L (ref 136–145)
WBC # BLD AUTO: 6.48 K/UL (ref 3.9–12.7)

## 2024-11-29 PROCEDURE — 85025 COMPLETE CBC W/AUTO DIFF WBC: CPT | Performed by: STUDENT IN AN ORGANIZED HEALTH CARE EDUCATION/TRAINING PROGRAM

## 2024-11-29 PROCEDURE — 85652 RBC SED RATE AUTOMATED: CPT | Performed by: STUDENT IN AN ORGANIZED HEALTH CARE EDUCATION/TRAINING PROGRAM

## 2024-11-29 PROCEDURE — 36415 COLL VENOUS BLD VENIPUNCTURE: CPT | Performed by: STUDENT IN AN ORGANIZED HEALTH CARE EDUCATION/TRAINING PROGRAM

## 2024-11-29 PROCEDURE — 80053 COMPREHEN METABOLIC PANEL: CPT | Performed by: STUDENT IN AN ORGANIZED HEALTH CARE EDUCATION/TRAINING PROGRAM

## 2024-11-29 PROCEDURE — 86140 C-REACTIVE PROTEIN: CPT | Performed by: STUDENT IN AN ORGANIZED HEALTH CARE EDUCATION/TRAINING PROGRAM

## 2024-12-04 ENCOUNTER — OFFICE VISIT (OUTPATIENT)
Dept: RHEUMATOLOGY | Facility: CLINIC | Age: 59
End: 2024-12-04
Payer: COMMERCIAL

## 2024-12-04 VITALS
WEIGHT: 128.5 LBS | DIASTOLIC BLOOD PRESSURE: 76 MMHG | OXYGEN SATURATION: 98 % | SYSTOLIC BLOOD PRESSURE: 127 MMHG | RESPIRATION RATE: 18 BRPM | HEIGHT: 58 IN | BODY MASS INDEX: 26.97 KG/M2 | TEMPERATURE: 98 F | HEART RATE: 70 BPM

## 2024-12-04 DIAGNOSIS — M79.7 FIBROMYALGIA: ICD-10-CM

## 2024-12-04 DIAGNOSIS — Z79.1 NSAID LONG-TERM USE: ICD-10-CM

## 2024-12-04 DIAGNOSIS — M15.0 PRIMARY OSTEOARTHRITIS INVOLVING MULTIPLE JOINTS: ICD-10-CM

## 2024-12-04 DIAGNOSIS — G62.9 PERIPHERAL POLYNEUROPATHY: ICD-10-CM

## 2024-12-04 DIAGNOSIS — Z71.89 COUNSELING AND COORDINATION OF CARE: ICD-10-CM

## 2024-12-04 DIAGNOSIS — M06.9 RHEUMATOID ARTHRITIS, INVOLVING UNSPECIFIED SITE, UNSPECIFIED WHETHER RHEUMATOID FACTOR PRESENT: Primary | ICD-10-CM

## 2024-12-04 DIAGNOSIS — Z79.60 LONG-TERM USE OF IMMUNOSUPPRESSANT MEDICATION: ICD-10-CM

## 2024-12-04 PROCEDURE — 99999 PR PBB SHADOW E&M-EST. PATIENT-LVL IV: CPT | Mod: PBBFAC,,, | Performed by: STUDENT IN AN ORGANIZED HEALTH CARE EDUCATION/TRAINING PROGRAM

## 2024-12-04 PROCEDURE — 99215 OFFICE O/P EST HI 40 MIN: CPT | Mod: S$GLB,,, | Performed by: STUDENT IN AN ORGANIZED HEALTH CARE EDUCATION/TRAINING PROGRAM

## 2024-12-04 RX ORDER — LEFLUNOMIDE 20 MG/1
20 TABLET ORAL DAILY
Qty: 90 TABLET | Refills: 2 | Status: SHIPPED | OUTPATIENT
Start: 2024-12-04

## 2024-12-04 RX ORDER — NAPROXEN 500 MG/1
500 TABLET ORAL 2 TIMES DAILY PRN
Qty: 180 TABLET | Refills: 1 | Status: SHIPPED | OUTPATIENT
Start: 2024-12-04

## 2024-12-04 RX ORDER — PREDNISONE 2.5 MG/1
2.5 TABLET ORAL
Qty: 90 TABLET | Refills: 1 | Status: SHIPPED | OUTPATIENT
Start: 2024-12-04

## 2024-12-04 RX ORDER — DULOXETIN HYDROCHLORIDE 60 MG/1
60 CAPSULE, DELAYED RELEASE ORAL DAILY
Qty: 90 CAPSULE | Refills: 1 | Status: SHIPPED | OUTPATIENT
Start: 2024-12-04

## 2024-12-04 NOTE — PROGRESS NOTES
"       RHEUMATOLOGY OUTPATIENT CLINIC NOTE    12/5/2024    Attending Rheumatologist: Deedee Anguiano  Primary Care Provider: No, Primary Doctor   Physician Requesting Consultation: No referring provider defined for this encounter.  Chief Complaint/Reason For Consultation:  Rheumatoid Arthritis      Subjective:       HPI  aTbby Garcia is a 59 y.o. Unknown female with medical history noted below who presents for evaluation of RA.   Patient presents for evaluation of arthritis. She notes diagnosis RA, FM, and Peripheral Neuropathy since 2000, patient of Dr. Hensley. Last seen by Rheum in 11/2022, Dr. HALE.   RA Meds- LEF 20mg (current). Prior Meds: Remicade, MTX, HCQ, Humira  FM-Cymbalta, Flexeril, Naproxen, Gabapentin  PN-Gabapentin  Uses PDN 2.5mg PRN & Tramadol PRN flares.   She reports there are good days and bad days, notes worse times is with weather changes, otherwise stable. No joint swelling. +Morning stiffness, fatigue, burning tingling of extremities.     Last appt  Patient here for follow up.   Last seen January 2023, at which point management continued. Had letter in Media saying Tramadol given by Dr. HALE, but since left I have provided. She notes using PRN with relief makes her feel like a "zombie". Does endorse joint pain in her hands and knees, sometimes swelling of PIP, notes it changes based on what she does. Otherwise stable, tolerating meds.     3/2024   Patient here for follow up of RA   She is currently on Leflunomide and prednisone prn (she reports takes 2-3 times a week).   She is taking Gabapentin and flexeril but not all the time because makes her feel like a zombie. She feels Naproxen is the medication that helps the most. She peoples snot want to do infusions or injectable medications.   She had her brother in law on home hospice and she was taking care of him- he passed 2 weeks ago- her pain exacerbated during this days.     6/2024:  Patient here for follow up of RA   Patient still " taking Leflunomide - has bee taking it for 15 yrs. Doing well. Having flare ups every month but subsides quickly- does not wish to change medications     12/5/2024:  2-3 times a month she is having a flare up but she thinks it is due to change in weather   More often pdn during winter   Naproxen taking almost everyday     Review of Systems   Constitutional:  Positive for fatigue. Negative for chills, fever and unexpected weight change.   HENT:  Negative for mouth sores and trouble swallowing.    Eyes:  Negative for redness and eye dryness.   Respiratory:  Negative for cough and shortness of breath.    Cardiovascular:  Negative for chest pain.   Gastrointestinal:  Negative for abdominal distention, constipation, diarrhea, nausea and vomiting.   Genitourinary:  Negative for dysuria, genital sores and vaginal dryness.   Musculoskeletal:  Positive for arthralgias, myalgias, neck pain and neck stiffness. Negative for back pain, gait problem, joint swelling, leg pain and joint deformity.   Integumentary:  Negative for rash.   Neurological:  Positive for numbness. Negative for weakness and headaches.   Hematological:  Negative for adenopathy. Does not bruise/bleed easily.   Psychiatric/Behavioral:  Positive for sleep disturbance. Negative for confusion and decreased concentration. The patient is not nervous/anxious.    All other systems reviewed and are negative.       Chronic comorbid conditions affecting medical decision making today:  Past Medical History:   Diagnosis Date    Amenorrhea 2016    started menopause    Dyspareunia 2016    started when I began menopause    Fibromyalgia     Infertility, female 1987    Rheumatoid arthritis, unspecified      History reviewed. No pertinent surgical history.  Family History   Problem Relation Name Age of Onset    Breast cancer Maternal Grandmother Angela         got breast cancer at 85 years old    Breast cancer Sister Suad         had a double masectomy 2019    Ovarian cancer  Paternal Aunt Jaz           from ovarian cancer     Social History     Substance and Sexual Activity   Alcohol Use Never     Social History     Tobacco Use   Smoking Status Never   Smokeless Tobacco Not on file     Social History     Substance and Sexual Activity   Drug Use Never       Current Outpatient Medications:     azelastine-fluticasone (DYMISTA) 137-50 mcg/spray Spry nassal spray, , Disp: , Rfl:     cyclobenzaprine (FLEXERIL) 5 MG tablet, Take 1 tablet (5 mg total) by mouth 3 (three) times daily as needed for Muscle spasms., Disp: 90 tablet, Rfl: 1    fluticasone propionate (FLONASE) 50 mcg/actuation nasal spray, 2 sprays by Nasal route., Disp: , Rfl:     traMADoL (ULTRAM) 50 mg tablet, Take 1 tablet (50 mg total) by mouth every 24 hours as needed for Pain., Disp: 30 tablet, Rfl: 0    DULoxetine (CYMBALTA) 60 MG capsule, Take 1 capsule (60 mg total) by mouth once daily., Disp: 90 capsule, Rfl: 1    gabapentin (NEURONTIN) 600 MG tablet, Take 1 tablet (600 mg total) by mouth every evening. (Patient not taking: Reported on 2024), Disp: 90 tablet, Rfl: 1    leflunomide (ARAVA) 20 MG Tab, Take 1 tablet (20 mg total) by mouth once daily., Disp: 90 tablet, Rfl: 2    naproxen (NAPROSYN) 500 MG tablet, Take 1 tablet (500 mg total) by mouth 2 (two) times daily as needed (pain)., Disp: 180 tablet, Rfl: 1    predniSONE (DELTASONE) 2.5 MG tablet, Take 1 tablet (2.5 mg total) by mouth as needed (flares)., Disp: 90 tablet, Rfl: 1     Objective:         Vitals:    24 0854   BP: 127/76   Pulse: 70   Resp: 18   Temp: 97.7 °F (36.5 °C)       Physical Exam  Can make fist, no synovitis, TTP over MCP/PIP/DIP  [place] and chente nodes  Knee crepitus  Negative ankle/MTP  Tender Points:  No   Yes  []    [x]   Low cervical anterior aspect    []    [x]   Costochondral Junction   []    [x]   Lateral Epicondyle  []    [x]   Suboccipital   []    [x]   Trapezius   []    [x]   Supraspinatus   []    [x]   Gluteal  "  []    [x]   Greater trochanter  []    [x]   Knee    Reviewed old and all outside pertinent medical records available.    All lab results personally reviewed and interpreted by me.  Lab Results   Component Value Date    WBC 6.48 11/29/2024    HGB 13.4 11/29/2024    HCT 39.8 11/29/2024    MCV 92 11/29/2024    MCH 30.9 11/29/2024    MCHC 33.7 11/29/2024    RDW 13.6 11/29/2024     11/29/2024    MPV 11.9 11/29/2024       Lab Results   Component Value Date     11/29/2024    K 4.1 11/29/2024     11/29/2024    CO2 22 (L) 11/29/2024    GLU 89 11/29/2024    BUN 22 (H) 11/29/2024    CALCIUM 9.5 11/29/2024    PROT 7.2 11/29/2024    ALBUMIN 3.9 11/29/2024    BILITOT 0.3 11/29/2024    AST 21 11/29/2024    ALKPHOS 115 11/29/2024    ALT 24 11/29/2024       No results found for: "COLORU", "APPEARANCEUA", "SPECGRAV", "PHUR", "PHUA", "PROTEINUA", "GLUCOSEU", "KETONESU", "BLOODU", "LEUKOCYTESUR", "NITRITE", "UROBILINOGEN"    Lab Results   Component Value Date    CRP 3.7 11/29/2024       Lab Results   Component Value Date    SEDRATE 5 11/29/2024       Lab Results   Component Value Date    SEDRATE 5 11/29/2024             Imaging:  All imaging reviewed and independently interpreted by me.         ASSESSMENT / PLAN:     Tabby Garcia is a 59 y.o. Unknown female with:      1. Rheumatoid arthritis, involving unspecified site, unspecified whether rheumatoid factor present  - patient with Known RA  - Inflammatory markers are normal as well as CBC and CMP   - continue LEF 20mg  - PDN PRN flares  - update labs  - reassurance     2. Fibromyalgia  - stable  - continue Naproxen, Gabapentin, Cymbalta, Flexeril  - sleep hygiene and stress management reinforced   - reassurance and exercise   - Exacerbated by recent death of family member  - Tramadol prn,  reviewed     3. Primary osteoarthritis involving multiple joints  - chronic  - Tramadol PRN,  reviewed  - reassurance and exercise    4. Peripheral polyneuropathy  - " stable  - continue Gabapentin   - gabapentin (NEURONTIN) 600 MG tablet; Take 1 tablet (600 mg total) by mouth once daily.  Dispense: 90 tablet; Refill: 3    5. Immunodeficiency due to treatment with immunosuppressive medication  - quarterly labs  - vaccines per guidelines   - immunosuppression/infectious precautions discussed     6. Chronic NSAID use  - no history of GI bleed or coronary artery disease.  - previous labs without features of CKD.  - clinical significance side effect of long-term NSAID use discussed in detail.  - recommended for alternative therapies for pain management.      7. Other specified counseling  - over 10 minutes spent regarding below topics:  - Immunization counseling done.  - Weight loss counseling done.  - Nutrition and exercise counseling.  - Limitation of alcohol consumption.  - Regular exercise:  Aerobic and resistance.  - Medication counseling provided.      Follow up in about 6 months (around 6/4/2025).    Method of contact with patient concerns: [facility]    Time spent: 40 minutes in face to face discussion concerning diagnosis, prognosis, review of lab and test results, benefits of treatment as well as management of disease, counseling of patient and coordination of care between various health care providers.  Greater than half the time spent was used for coordination of care and counseling of patient.    Deedee Anguiano M.D.  Rheumatology Department   [facility]

## 2025-02-19 ENCOUNTER — TELEPHONE (OUTPATIENT)
Dept: RHEUMATOLOGY | Facility: CLINIC | Age: 60
End: 2025-02-19
Payer: COMMERCIAL

## 2025-02-19 NOTE — TELEPHONE ENCOUNTER
----- Message from Aretha sent at 2/19/2025  9:15 AM CST -----  Regarding: Call back  Contact: [phone]  Who Called: PT Patient is calling to schedule her 6 month follow up. Please advice

## 2025-05-17 ENCOUNTER — HOSPITAL ENCOUNTER (OUTPATIENT)
Dept: RADIOLOGY | Facility: HOSPITAL | Age: 60
Discharge: HOME | End: 2025-05-17
Attending: STUDENT IN AN ORGANIZED HEALTH CARE EDUCATION/TRAINING PROGRAM
Payer: COMMERCIAL

## 2025-05-17 DIAGNOSIS — M06.9 RHEUMATOID ARTHRITIS, INVOLVING UNSPECIFIED SITE, UNSPECIFIED WHETHER RHEUMATOID FACTOR PRESENT: ICD-10-CM

## 2025-05-17 PROCEDURE — 73130 X-RAY EXAM OF HAND: CPT | Mod: TC,50,FY

## 2025-05-17 PROCEDURE — 73130 X-RAY EXAM OF HAND: CPT | Mod: 26,50,, | Performed by: RADIOLOGY

## 2025-06-03 ENCOUNTER — OFFICE VISIT (OUTPATIENT)
Dept: RHEUMATOLOGY | Facility: CLINIC | Age: 60
End: 2025-06-03
Payer: COMMERCIAL

## 2025-06-03 DIAGNOSIS — Z79.1 NSAID LONG-TERM USE: ICD-10-CM

## 2025-06-03 DIAGNOSIS — M15.0 PRIMARY OSTEOARTHRITIS INVOLVING MULTIPLE JOINTS: ICD-10-CM

## 2025-06-03 DIAGNOSIS — M79.7 FIBROMYALGIA: ICD-10-CM

## 2025-06-03 DIAGNOSIS — Z79.60 LONG-TERM USE OF IMMUNOSUPPRESSANT MEDICATION: ICD-10-CM

## 2025-06-03 DIAGNOSIS — M06.9 RHEUMATOID ARTHRITIS, INVOLVING UNSPECIFIED SITE, UNSPECIFIED WHETHER RHEUMATOID FACTOR PRESENT: Primary | ICD-10-CM

## 2025-06-03 DIAGNOSIS — Z71.89 COUNSELING AND COORDINATION OF CARE: ICD-10-CM

## 2025-06-03 DIAGNOSIS — G62.9 PERIPHERAL POLYNEUROPATHY: ICD-10-CM

## 2025-06-03 PROCEDURE — 98006 SYNCH AUDIO-VIDEO EST MOD 30: CPT | Mod: 95,,, | Performed by: STUDENT IN AN ORGANIZED HEALTH CARE EDUCATION/TRAINING PROGRAM

## 2025-06-03 PROCEDURE — G2211 COMPLEX E/M VISIT ADD ON: HCPCS | Mod: 95,,, | Performed by: STUDENT IN AN ORGANIZED HEALTH CARE EDUCATION/TRAINING PROGRAM

## 2025-06-03 RX ORDER — CYCLOBENZAPRINE HCL 5 MG
5 TABLET ORAL 3 TIMES DAILY
Qty: 270 TABLET | Refills: 3 | Status: SHIPPED | OUTPATIENT
Start: 2025-06-03 | End: 2026-06-03

## 2025-06-03 RX ORDER — PREDNISONE 2.5 MG/1
2.5 TABLET ORAL
Qty: 90 TABLET | Refills: 2 | Status: SHIPPED | OUTPATIENT
Start: 2025-06-03

## 2025-06-03 RX ORDER — LEFLUNOMIDE 20 MG/1
20 TABLET ORAL DAILY
Qty: 90 TABLET | Refills: 2 | Status: SHIPPED | OUTPATIENT
Start: 2025-06-03 | End: 2026-02-28

## 2025-06-03 RX ORDER — NAPROXEN 500 MG/1
500 TABLET ORAL 2 TIMES DAILY PRN
Qty: 180 TABLET | Refills: 3 | Status: SHIPPED | OUTPATIENT
Start: 2025-06-03 | End: 2026-06-03

## 2025-06-03 RX ORDER — DULOXETIN HYDROCHLORIDE 60 MG/1
60 CAPSULE, DELAYED RELEASE ORAL DAILY
Qty: 90 CAPSULE | Refills: 3 | Status: SHIPPED | OUTPATIENT
Start: 2025-06-03 | End: 2026-06-03

## 2025-06-03 RX ORDER — GABAPENTIN 600 MG/1
600 TABLET ORAL NIGHTLY
Qty: 90 TABLET | Refills: 3 | Status: SHIPPED | OUTPATIENT
Start: 2025-06-03 | End: 2026-06-03

## 2025-06-09 ENCOUNTER — TELEPHONE (OUTPATIENT)
Dept: RHEUMATOLOGY | Facility: CLINIC | Age: 60
End: 2025-06-09
Payer: COMMERCIAL

## 2025-06-09 NOTE — TELEPHONE ENCOUNTER
Returned patients call NA/LM on VM of the date and time that I scheduled the virtual.    ----- Message from Lucero sent at 6/9/2025  4:17 PM CDT -----  Appointment RequestName of Caller:PtSymptoms or reason for appointment:6m follow up/ Virtual in December. She would like a morning. I cannot schedule virtual.Best Call Back Number:[phone]

## 2025-06-09 NOTE — TELEPHONE ENCOUNTER
----- Message from Lucero sent at 6/9/2025  4:17 PM CDT -----  Appointment RequestName of Caller:PtSymptoms or reason for appointment:6m follow up/ Virtual in December. She would like a morning. I cannot schedule virtual.Best Call Back Number:[phone]